# Patient Record
Sex: FEMALE | Race: BLACK OR AFRICAN AMERICAN | Employment: FULL TIME | URBAN - METROPOLITAN AREA
[De-identification: names, ages, dates, MRNs, and addresses within clinical notes are randomized per-mention and may not be internally consistent; named-entity substitution may affect disease eponyms.]

---

## 2018-04-18 ENCOUNTER — TELEPHONE (OUTPATIENT)
Dept: ORTHOPEDIC SURGERY | Age: 43
End: 2018-04-18

## 2018-11-15 ENCOUNTER — APPOINTMENT (OUTPATIENT)
Dept: CT IMAGING | Age: 43
End: 2018-11-15
Payer: COMMERCIAL

## 2018-11-15 ENCOUNTER — HOSPITAL ENCOUNTER (EMERGENCY)
Age: 43
Discharge: HOME | End: 2018-11-15
Attending: EMERGENCY MEDICINE
Payer: COMMERCIAL

## 2018-11-15 VITALS
SYSTOLIC BLOOD PRESSURE: 131 MMHG | WEIGHT: 217 LBS | RESPIRATION RATE: 16 BRPM | DIASTOLIC BLOOD PRESSURE: 74 MMHG | HEIGHT: 66 IN | TEMPERATURE: 97.9 F | BODY MASS INDEX: 34.87 KG/M2 | HEART RATE: 70 BPM | OXYGEN SATURATION: 100 %

## 2018-11-15 DIAGNOSIS — R51.9 ACUTE NONINTRACTABLE HEADACHE, UNSPECIFIED HEADACHE TYPE: Primary | ICD-10-CM

## 2018-11-15 DIAGNOSIS — R42 INTERMITTENT LIGHTHEADEDNESS: ICD-10-CM

## 2018-11-15 LAB
A/G RATIO: 1.2 (ref 1.1–2.2)
ALBUMIN SERPL-MCNC: 4.2 G/DL (ref 3.4–5)
ALP BLD-CCNC: 43 U/L (ref 40–129)
ALT SERPL-CCNC: 11 U/L (ref 10–40)
ANION GAP SERPL CALCULATED.3IONS-SCNC: 10 MMOL/L (ref 3–16)
AST SERPL-CCNC: 12 U/L (ref 15–37)
BASOPHILS ABSOLUTE: 0 K/UL (ref 0–0.2)
BASOPHILS RELATIVE PERCENT: 0.9 %
BILIRUB SERPL-MCNC: 0.5 MG/DL (ref 0–1)
BUN BLDV-MCNC: 13 MG/DL (ref 7–20)
CALCIUM SERPL-MCNC: 8.8 MG/DL (ref 8.3–10.6)
CHLORIDE BLD-SCNC: 101 MMOL/L (ref 99–110)
CO2: 27 MMOL/L (ref 21–32)
CREAT SERPL-MCNC: 0.6 MG/DL (ref 0.6–1.1)
EKG ATRIAL RATE: 68 BPM
EKG DIAGNOSIS: NORMAL
EKG P AXIS: 20 DEGREES
EKG P-R INTERVAL: 170 MS
EKG Q-T INTERVAL: 412 MS
EKG QRS DURATION: 86 MS
EKG QTC CALCULATION (BAZETT): 438 MS
EKG R AXIS: 12 DEGREES
EKG T AXIS: 0 DEGREES
EKG VENTRICULAR RATE: 68 BPM
EOSINOPHILS ABSOLUTE: 0.1 K/UL (ref 0–0.6)
EOSINOPHILS RELATIVE PERCENT: 1.1 %
GFR AFRICAN AMERICAN: >60
GFR NON-AFRICAN AMERICAN: >60
GLOBULIN: 3.4 G/DL
GLUCOSE BLD-MCNC: 96 MG/DL (ref 70–99)
HCG QUALITATIVE: NEGATIVE
HCT VFR BLD CALC: 37.6 % (ref 36–48)
HEMOGLOBIN: 12.2 G/DL (ref 12–16)
LYMPHOCYTES ABSOLUTE: 2 K/UL (ref 1–5.1)
LYMPHOCYTES RELATIVE PERCENT: 41.6 %
MCH RBC QN AUTO: 25.9 PG (ref 26–34)
MCHC RBC AUTO-ENTMCNC: 32.4 G/DL (ref 31–36)
MCV RBC AUTO: 79.9 FL (ref 80–100)
MONOCYTES ABSOLUTE: 0.3 K/UL (ref 0–1.3)
MONOCYTES RELATIVE PERCENT: 6.5 %
NEUTROPHILS ABSOLUTE: 2.4 K/UL (ref 1.7–7.7)
NEUTROPHILS RELATIVE PERCENT: 49.9 %
PDW BLD-RTO: 14.7 % (ref 12.4–15.4)
PLATELET # BLD: 305 K/UL (ref 135–450)
PMV BLD AUTO: 8.2 FL (ref 5–10.5)
POTASSIUM REFLEX MAGNESIUM: 3.9 MMOL/L (ref 3.5–5.1)
RBC # BLD: 4.7 M/UL (ref 4–5.2)
SODIUM BLD-SCNC: 138 MMOL/L (ref 136–145)
TOTAL PROTEIN: 7.6 G/DL (ref 6.4–8.2)
WBC # BLD: 4.9 K/UL (ref 4–11)

## 2018-11-15 PROCEDURE — 93010 ELECTROCARDIOGRAM REPORT: CPT | Performed by: INTERNAL MEDICINE

## 2018-11-15 PROCEDURE — 84703 CHORIONIC GONADOTROPIN ASSAY: CPT

## 2018-11-15 PROCEDURE — 85025 COMPLETE CBC W/AUTO DIFF WBC: CPT

## 2018-11-15 PROCEDURE — 6360000002 HC RX W HCPCS: Performed by: EMERGENCY MEDICINE

## 2018-11-15 PROCEDURE — 80053 COMPREHEN METABOLIC PANEL: CPT

## 2018-11-15 PROCEDURE — 96361 HYDRATE IV INFUSION ADD-ON: CPT

## 2018-11-15 PROCEDURE — 99285 EMERGENCY DEPT VISIT HI MDM: CPT

## 2018-11-15 PROCEDURE — 70496 CT ANGIOGRAPHY HEAD: CPT

## 2018-11-15 PROCEDURE — 6360000004 HC RX CONTRAST MEDICATION: Performed by: PHYSICIAN ASSISTANT

## 2018-11-15 PROCEDURE — 93005 ELECTROCARDIOGRAM TRACING: CPT | Performed by: PHYSICIAN ASSISTANT

## 2018-11-15 PROCEDURE — 96374 THER/PROPH/DIAG INJ IV PUSH: CPT

## 2018-11-15 PROCEDURE — 2580000003 HC RX 258: Performed by: PHYSICIAN ASSISTANT

## 2018-11-15 RX ORDER — KETOROLAC TROMETHAMINE 30 MG/ML
30 INJECTION, SOLUTION INTRAMUSCULAR; INTRAVENOUS ONCE
Status: COMPLETED | OUTPATIENT
Start: 2018-11-15 | End: 2018-11-15

## 2018-11-15 RX ORDER — BUTALBITAL, ACETAMINOPHEN AND CAFFEINE 300; 40; 50 MG/1; MG/1; MG/1
1 CAPSULE ORAL EVERY 6 HOURS PRN
Qty: 20 CAPSULE | Refills: 0 | Status: SHIPPED | OUTPATIENT
Start: 2018-11-15 | End: 2022-09-15

## 2018-11-15 RX ORDER — 0.9 % SODIUM CHLORIDE 0.9 %
1000 INTRAVENOUS SOLUTION INTRAVENOUS ONCE
Status: COMPLETED | OUTPATIENT
Start: 2018-11-15 | End: 2018-11-15

## 2018-11-15 RX ADMIN — SODIUM CHLORIDE 1000 ML: 9 INJECTION, SOLUTION INTRAVENOUS at 10:57

## 2018-11-15 RX ADMIN — IOPAMIDOL 75 ML: 755 INJECTION, SOLUTION INTRAVENOUS at 11:45

## 2018-11-15 RX ADMIN — KETOROLAC TROMETHAMINE 30 MG: 30 INJECTION, SOLUTION INTRAMUSCULAR at 13:06

## 2018-11-15 ASSESSMENT — ENCOUNTER SYMPTOMS
NAUSEA: 0
DIARRHEA: 0
BACK PAIN: 0
SHORTNESS OF BREATH: 0
COLOR CHANGE: 0
VOMITING: 0
CONSTIPATION: 0
ABDOMINAL PAIN: 0

## 2018-11-15 ASSESSMENT — PAIN SCALES - GENERAL: PAINLEVEL_OUTOF10: 7

## 2018-11-15 NOTE — ED PROVIDER NOTES
reviewed and agreed with or any disagreements were addressed  in the HPI. REVIEW OF SYSTEMS    (2-9 systems for level 4, 10 or more for level 5)     Review of Systems   Constitutional: Negative for chills, fatigue and fever. Respiratory: Negative for shortness of breath. Cardiovascular: Negative for chest pain. Gastrointestinal: Negative for abdominal pain, constipation, diarrhea, nausea and vomiting. Genitourinary: Negative for difficulty urinating and dysuria. Musculoskeletal: Negative for back pain and neck pain. Skin: Negative for color change and rash. Neurological: Positive for light-headedness and headaches. Negative for dizziness, syncope, weakness and numbness. All other systems reviewed and are negative. Positives and pertinent negatives as per HPI. All other systems were reviewed and are negative. PHYSICAL EXAM    (up to 7 for level 4, 8 or more for level 5)     ED Triage Vitals [11/15/18 0942]   BP Temp Temp Source Pulse Resp SpO2 Height Weight   135/82 97.9 °F (36.6 °C) Infrared 80 14 96 % 5' 6\" (1.676 m) 217 lb (98.4 kg)       Physical Exam   Constitutional: She is oriented to person, place, and time. She appears well-developed and well-nourished. She is active and cooperative. Non-toxic appearance. HENT:   Head: Normocephalic. Right Ear: External ear normal.   Left Ear: External ear normal.   Nose: Nose normal.   Eyes: Pupils are equal, round, and reactive to light. Conjunctivae and EOM are normal. Right eye exhibits no discharge. Left eye exhibits no discharge. Neck: Normal range of motion, full passive range of motion without pain and phonation normal. Neck supple. No spinous process tenderness and no muscular tenderness present. No neck rigidity. Normal range of motion present. No Brudzinski's sign and no Kernig's sign noted. Cardiovascular: Normal rate, regular rhythm and normal heart sounds. Exam reveals no gallop and no friction rub.     No murmur referral          [facility]  [phone]          DISCHARGE MEDICATIONS:  Discharge Medication List as of 11/15/2018  1:18 PM      START taking these medications    Details   butalbital-APAP-caffeine (FIORICET) -40 MG CAPS per capsule Take 1 capsule by mouth every 6 hours as needed for Headaches, Disp-20 capsule, R-0Print             DISCONTINUED MEDICATIONS:  Discharge Medication List as of 11/15/2018  1:18 PM                 (Please note that portions of this note were completed with a voice recognition program.  Efforts were made to edit the dictations but occasionally words aremis-transcribed.)    TRANG Abad (electronically signed)            TRANG Nova  11/15/18 2923

## 2022-08-25 ENCOUNTER — OFFICE VISIT (OUTPATIENT)
Dept: FAMILY MEDICINE CLINIC | Age: 47
End: 2022-08-25
Payer: COMMERCIAL

## 2022-08-25 ENCOUNTER — TELEPHONE (OUTPATIENT)
Dept: FAMILY MEDICINE CLINIC | Age: 47
End: 2022-08-25

## 2022-08-25 VITALS
DIASTOLIC BLOOD PRESSURE: 76 MMHG | OXYGEN SATURATION: 96 % | HEART RATE: 68 BPM | BODY MASS INDEX: 35.68 KG/M2 | HEIGHT: 66 IN | SYSTOLIC BLOOD PRESSURE: 112 MMHG | WEIGHT: 222 LBS

## 2022-08-25 DIAGNOSIS — Z13.31 POSITIVE DEPRESSION SCREENING: ICD-10-CM

## 2022-08-25 DIAGNOSIS — Z76.89 ENCOUNTER TO ESTABLISH CARE: Primary | ICD-10-CM

## 2022-08-25 DIAGNOSIS — N95.1 MENOPAUSAL SYMPTOMS: ICD-10-CM

## 2022-08-25 DIAGNOSIS — Z13.1 SCREENING FOR DIABETES MELLITUS: ICD-10-CM

## 2022-08-25 DIAGNOSIS — Z13.220 SCREENING FOR LIPID DISORDERS: ICD-10-CM

## 2022-08-25 DIAGNOSIS — Z12.11 SCREEN FOR COLON CANCER: ICD-10-CM

## 2022-08-25 PROBLEM — O24.410 DIET CONTROLLED GESTATIONAL DIABETES MELLITUS (GDM), ANTEPARTUM: Status: ACTIVE | Noted: 2022-08-25

## 2022-08-25 PROBLEM — O24.410 DIET CONTROLLED GESTATIONAL DIABETES MELLITUS (GDM), ANTEPARTUM: Status: RESOLVED | Noted: 2022-08-25 | Resolved: 2022-08-25

## 2022-08-25 LAB
A/G RATIO: 1.7 (ref 1.1–2.2)
ALBUMIN SERPL-MCNC: 4.4 G/DL (ref 3.4–5)
ALP BLD-CCNC: 47 U/L (ref 40–129)
ALT SERPL-CCNC: 11 U/L (ref 10–40)
ANION GAP SERPL CALCULATED.3IONS-SCNC: 9 MMOL/L (ref 3–16)
AST SERPL-CCNC: 14 U/L (ref 15–37)
BILIRUB SERPL-MCNC: 0.3 MG/DL (ref 0–1)
BUN BLDV-MCNC: 9 MG/DL (ref 7–20)
CALCIUM SERPL-MCNC: 9.2 MG/DL (ref 8.3–10.6)
CHLORIDE BLD-SCNC: 103 MMOL/L (ref 99–110)
CHOLESTEROL, TOTAL: 183 MG/DL (ref 0–199)
CO2: 26 MMOL/L (ref 21–32)
CREAT SERPL-MCNC: 0.7 MG/DL (ref 0.6–1.1)
ESTIMATED AVERAGE GLUCOSE: 145.6 MG/DL
GFR AFRICAN AMERICAN: >60
GFR NON-AFRICAN AMERICAN: >60
GLUCOSE BLD-MCNC: 103 MG/DL (ref 70–99)
HBA1C MFR BLD: 6.7 %
HDLC SERPL-MCNC: 50 MG/DL (ref 40–60)
LDL CHOLESTEROL CALCULATED: 117 MG/DL
POTASSIUM SERPL-SCNC: 4.4 MMOL/L (ref 3.5–5.1)
SODIUM BLD-SCNC: 138 MMOL/L (ref 136–145)
TOTAL PROTEIN: 7 G/DL (ref 6.4–8.2)
TRIGL SERPL-MCNC: 82 MG/DL (ref 0–150)
VLDLC SERPL CALC-MCNC: 16 MG/DL

## 2022-08-25 PROCEDURE — 99204 OFFICE O/P NEW MOD 45 MIN: CPT | Performed by: NURSE PRACTITIONER

## 2022-08-25 RX ORDER — ESCITALOPRAM OXALATE 10 MG/1
10 TABLET ORAL DAILY
Qty: 30 TABLET | Refills: 0 | Status: SHIPPED | OUTPATIENT
Start: 2022-08-25 | End: 2022-09-19

## 2022-08-25 SDOH — ECONOMIC STABILITY: TRANSPORTATION INSECURITY
IN THE PAST 12 MONTHS, HAS LACK OF TRANSPORTATION KEPT YOU FROM MEETINGS, WORK, OR FROM GETTING THINGS NEEDED FOR DAILY LIVING?: NO

## 2022-08-25 SDOH — HEALTH STABILITY: PHYSICAL HEALTH: ON AVERAGE, HOW MANY MINUTES DO YOU ENGAGE IN EXERCISE AT THIS LEVEL?: 150+ MIN

## 2022-08-25 SDOH — ECONOMIC STABILITY: TRANSPORTATION INSECURITY
IN THE PAST 12 MONTHS, HAS THE LACK OF TRANSPORTATION KEPT YOU FROM MEDICAL APPOINTMENTS OR FROM GETTING MEDICATIONS?: NO

## 2022-08-25 SDOH — ECONOMIC STABILITY: FOOD INSECURITY: WITHIN THE PAST 12 MONTHS, THE FOOD YOU BOUGHT JUST DIDN'T LAST AND YOU DIDN'T HAVE MONEY TO GET MORE.: NEVER TRUE

## 2022-08-25 SDOH — HEALTH STABILITY: PHYSICAL HEALTH: ON AVERAGE, HOW MANY DAYS PER WEEK DO YOU ENGAGE IN MODERATE TO STRENUOUS EXERCISE (LIKE A BRISK WALK)?: 5 DAYS

## 2022-08-25 SDOH — ECONOMIC STABILITY: FOOD INSECURITY: WITHIN THE PAST 12 MONTHS, YOU WORRIED THAT YOUR FOOD WOULD RUN OUT BEFORE YOU GOT MONEY TO BUY MORE.: NEVER TRUE

## 2022-08-25 ASSESSMENT — PATIENT HEALTH QUESTIONNAIRE - PHQ9
6. FEELING BAD ABOUT YOURSELF - OR THAT YOU ARE A FAILURE OR HAVE LET YOURSELF OR YOUR FAMILY DOWN: 1
9. THOUGHTS THAT YOU WOULD BE BETTER OFF DEAD, OR OF HURTING YOURSELF: 0
2. FEELING DOWN, DEPRESSED OR HOPELESS: 1
8. MOVING OR SPEAKING SO SLOWLY THAT OTHER PEOPLE COULD HAVE NOTICED. OR THE OPPOSITE, BEING SO FIGETY OR RESTLESS THAT YOU HAVE BEEN MOVING AROUND A LOT MORE THAN USUAL: 0
3. TROUBLE FALLING OR STAYING ASLEEP: 2
SUM OF ALL RESPONSES TO PHQ QUESTIONS 1-9: 10
4. FEELING TIRED OR HAVING LITTLE ENERGY: 2
5. POOR APPETITE OR OVEREATING: 2
SUM OF ALL RESPONSES TO PHQ QUESTIONS 1-9: 10
1. LITTLE INTEREST OR PLEASURE IN DOING THINGS: 2
7. TROUBLE CONCENTRATING ON THINGS, SUCH AS READING THE NEWSPAPER OR WATCHING TELEVISION: 0
10. IF YOU CHECKED OFF ANY PROBLEMS, HOW DIFFICULT HAVE THESE PROBLEMS MADE IT FOR YOU TO DO YOUR WORK, TAKE CARE OF THINGS AT HOME, OR GET ALONG WITH OTHER PEOPLE: 0
SUM OF ALL RESPONSES TO PHQ9 QUESTIONS 1 & 2: 3

## 2022-08-25 ASSESSMENT — SOCIAL DETERMINANTS OF HEALTH (SDOH)
WITHIN THE LAST YEAR, HAVE YOU BEEN HUMILIATED OR EMOTIONALLY ABUSED IN OTHER WAYS BY YOUR PARTNER OR EX-PARTNER?: NO
HOW HARD IS IT FOR YOU TO PAY FOR THE VERY BASICS LIKE FOOD, HOUSING, MEDICAL CARE, AND HEATING?: NOT HARD AT ALL
WITHIN THE LAST YEAR, HAVE YOU BEEN KICKED, HIT, SLAPPED, OR OTHERWISE PHYSICALLY HURT BY YOUR PARTNER OR EX-PARTNER?: NO
WITHIN THE LAST YEAR, HAVE TO BEEN RAPED OR FORCED TO HAVE ANY KIND OF SEXUAL ACTIVITY BY YOUR PARTNER OR EX-PARTNER?: NO
WITHIN THE LAST YEAR, HAVE YOU BEEN AFRAID OF YOUR PARTNER OR EX-PARTNER?: NO

## 2022-08-25 ASSESSMENT — ENCOUNTER SYMPTOMS
SINUS PAIN: 0
ABDOMINAL DISTENTION: 0
ABDOMINAL PAIN: 0
SHORTNESS OF BREATH: 0
SORE THROAT: 0
SINUS PRESSURE: 0
WHEEZING: 0
EYE REDNESS: 0
COUGH: 0
EYE PAIN: 0
NAUSEA: 0
VOMITING: 0
DIARRHEA: 0
EYE DISCHARGE: 0

## 2022-08-25 NOTE — TELEPHONE ENCOUNTER
I was going to wait for everything to be back, but here's my result note: Your A1C is still pending. Your LDL (Bad cholesterol) is elevated. No medication is needed for this at this time, but you can help lower by decreasing fatty/processed foods in your diet, increasing cardiac exercise, and weight loss. Your fasting sugar was very slightly elevated. The A1C is a much more accurate check for diabetes, so we will see what this shows. One of the liver enzymes was low, but this is nothing to be concerned about.

## 2022-08-25 NOTE — PROGRESS NOTES
Jane Jackson (:  1975) is a 52 y.o. female,Established patient, here for evaluation of the following chief complaint(s):  New Patient (NEW PATIENT - EST CARE ) and Other (PT IS HAVING HOT FLASHES AND STARTS CRYING FOR NO REASON X 2 WEEKS - DISCUSS FMLA PAPERWORK )      ASSESSMENT/PLAN:  1. Encounter to establish care  -The patient's medical, surgical, social, and family history were reviewed with the patient. Medications were reconciled. 2. Menopausal symptoms  -Presentation is consistent with menopausal syndrome. I educated the patient that I do not fill out FMLA for menopausal symptoms. Not appropriate. We will start escitalopram to help with the mood fluctuation. Educated on the medication use as well as side effects. Follow-up in 1 month virtually, or sooner if needed.  -The patient is having significant hot flashes. Already on birth control. She is not established with a gynecologist at this time. Overdue for a Pap. I did recommend that the patient establish with a gynecologist and discussed this with them. -     escitalopram (LEXAPRO) 10 MG tablet; Take 1 tablet by mouth daily, Disp-30 tablet, R-0Normal  -     Jace Hilliard MD, Gynecology, [facility]  3. Screening for lipid disorders  -Has not had labs in multiple years. Check labs today. -     Lipid Panel; Future  4. Screening for diabetes mellitus  -Has not had labs in multiple years. Check labs today. -     Comprehensive Metabolic Panel; Future  -     Hemoglobin A1C; Future  5. Screen for colon cancer  -Discussed options. Referring to GI. At the end of the appointment, the patient mentions some issues with swallowing. May benefit from EGD with colonoscopy. Encouraged to discuss this with GI.  -     TANESHA Aguirre MD, Gastroenterology, [facility]  6.  Positive depression screening  - PHQ-9 score today: (PHQ-9 Total Score: 10), additional evaluation and assessment performed, follow-up plan includes but not limited to: Starting escitalopram.  Follow-up in 1 month, or sooner if needed    Return in about 1 month (around 9/25/2022) for Follow-up menopause. SUBJECTIVE/OBJECTIVE:  JAK Collins presents today to establish care. Her main concern is menopausal symptoms. She states that she has been having significant hot flashes. She also states that she has been finding herself crying for no reason. It got to the point last week that work told her to go home. She states that they told her to inquire about FMLA. Her depression screen is positive today. She states that she overall does not feel depressed but states that she can be laughing and crying at the same time. No thoughts of suicidal ideation or self-harm. She does take birth control. Not established with a gynecologist.    The patient has not seen a PCP in quite some time. Fasting for labs today. Agreeable to colonoscopy. At the end of the appointment, she mentioned that she has felt that food gets caught sometimes in her esophagus. Inquiring what to do about this. She works as a  for the [employer]. Works the night shift. Has 5 kids. Very wide age range. Has a 10year-old which was a surprise. Only significant medical history outside of pregnancy is an episode in 2005 where she may have had a stroke. 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 Symptoms alleviated on their own. She does not smoke. Drinks socially. Review of Systems   Constitutional:  Negative for chills and fever. HENT:  Negative for ear discharge, ear pain, hearing loss, sinus pressure, sinus pain and sore throat. Eyes:  Negative for pain, discharge and redness. Respiratory:  Negative for cough, shortness of breath and wheezing. Cardiovascular:  Negative for chest pain and palpitations. Gastrointestinal:  Negative for abdominal distention, abdominal pain, diarrhea, nausea and vomiting. Endocrine:         Hot flashes   Genitourinary:  Negative for dysuria and hematuria. Musculoskeletal:  Negative for myalgias. Skin:  Negative for rash. Neurological:  Negative for dizziness, weakness, light-headedness, numbness and headaches. Psychiatric/Behavioral:  Negative for decreased concentration, dysphoric mood, self-injury, sleep disturbance and suicidal ideas. The patient is not nervous/anxious. Mood fluctuation     Physical Exam  Constitutional:       General: She is not in acute distress. Appearance: Normal appearance. She is obese. HENT:      Head: Normocephalic and atraumatic. Right Ear: Tympanic membrane, ear canal and external ear normal.      Left Ear: Tympanic membrane, ear canal and external ear normal.      Mouth/Throat:      Mouth: Mucous membranes are moist.   Eyes:      Extraocular Movements: Extraocular movements intact. Conjunctiva/sclera: Conjunctivae normal.      Pupils: Pupils are equal, round, and reactive to light. Neck:      Thyroid: No thyromegaly. Vascular: No carotid bruit. Cardiovascular:      Rate and Rhythm: Normal rate and regular rhythm. Pulses: Normal pulses. Heart sounds: Normal heart sounds. No murmur heard. No gallop. Pulmonary:      Effort: Pulmonary effort is normal.      Breath sounds: Normal breath sounds. No wheezing. Abdominal:      General: Bowel sounds are normal.      Palpations: Abdomen is soft. Tenderness: There is no abdominal tenderness. There is no guarding or rebound. Musculoskeletal:         General: Normal range of motion. Cervical back: Normal range of motion and neck supple. Lymphadenopathy:      Cervical: No cervical adenopathy. Skin:     General: Skin is warm and dry. Capillary Refill: Capillary refill takes less than 2 seconds. Neurological:      Mental Status: She is alert and oriented to person, place, and time.    Psychiatric:         Mood and Affect: Mood normal.         Behavior: Behavior

## 2022-08-25 NOTE — TELEPHONE ENCOUNTER
Pt saw Melly Person today. She has questions about her blood work results. She  saw that a number was high. Wants to know if there is something she should be doing.

## 2022-08-25 NOTE — LETTER
[address] [address] [address] [facility] [address]  Phone: [phone]  Fax: [phone]    RAMIN Maldonado CNP        August 25, 2022     Patient: Efren Vicente   YOB: 1975   Date of Visit: 8/25/2022       To Whom It May Concern: It is my medical opinion that Edwin Thomas may return to work on 8/26/2022. If you have any questions or concerns, please don't hesitate to call.     Sincerely,            RAMIN Maldonado CNP

## 2022-09-02 ENCOUNTER — OFFICE VISIT (OUTPATIENT)
Dept: GYNECOLOGY | Age: 47
End: 2022-09-02
Payer: COMMERCIAL

## 2022-09-02 VITALS
WEIGHT: 219 LBS | DIASTOLIC BLOOD PRESSURE: 76 MMHG | BODY MASS INDEX: 35.35 KG/M2 | HEART RATE: 74 BPM | SYSTOLIC BLOOD PRESSURE: 130 MMHG

## 2022-09-02 DIAGNOSIS — Z01.419 WELL WOMAN EXAM WITH ROUTINE GYNECOLOGICAL EXAM: Primary | ICD-10-CM

## 2022-09-02 DIAGNOSIS — R23.2 HOT FLASHES: ICD-10-CM

## 2022-09-02 PROCEDURE — 99386 PREV VISIT NEW AGE 40-64: CPT | Performed by: OBSTETRICS & GYNECOLOGY

## 2022-09-02 NOTE — PROGRESS NOTES
Subjective:      Patient ID: Adilene Coleman is a 52 y.o. female. HPI  pts here for annual gyn exam.  Having hot flashes. On ocps and lexapro for sxs. Works nights at the [employer]. Has a 10year old son. Review of Systems Pertinent review of systems items discussed above. All others systems items not discussed above were negative. Objective:   Physical Exam  Constitutional:       Appearance: She is well-developed. HENT:      Head: Normocephalic and atraumatic. Neck:      Thyroid: No thyromegaly. Trachea: No tracheal deviation. Cardiovascular:      Rate and Rhythm: Normal rate and regular rhythm. Heart sounds: Normal heart sounds. No murmur heard. Pulmonary:      Effort: Pulmonary effort is normal. No respiratory distress. Breath sounds: Normal breath sounds. No wheezing or rales. Chest:   Breasts:     Right: No mass, nipple discharge or skin change. Left: No mass, nipple discharge or skin change. Abdominal:      General: There is no distension. Palpations: Abdomen is soft. There is no mass. Tenderness: There is no abdominal tenderness. There is no rebound. Genitourinary:     Labia:         Right: No lesion. Left: No lesion. Vagina: Normal. No foreign body. No vaginal discharge. Cervix: No cervical motion tenderness, discharge or friability. Uterus: Not deviated, not enlarged, not fixed and not tender. Adnexa:         Right: No mass or tenderness. Left: No mass or tenderness. Rectum: Normal. No external hemorrhoid. Comments: Pap performed. Musculoskeletal:         General: Normal range of motion. Lymphadenopathy:      Cervical: No cervical adenopathy. Neurological:      Mental Status: She is alert and oriented to person, place, and time. Assessment:   Normal gyn exam     Plan:   Recommend Bryon Terrell. Mammogram.  Call with results.   F/u annual gyn exam.       Yaquelin Winkler MD

## 2022-09-15 NOTE — PROGRESS NOTES
[facility] ENDOSCOPY EGD PRE-OPERATIVE INSTRUCTIONS    Procedure date__9/21/2022_______  Arrival time__830__________          Surgery time___930_________       Nothing by mouth after midnight the night before the procedure. This includes water chewing gum, mints and ice chips. You may brush your teeth and gargle the morning of your surgery, but do not swallow the water    You may be asked to stop blood thinners such as Coumadin, Plavix, Fragmin, Lovenox, etc., or any anti-inflammatories such as:  Aspirin, Ibuprofen, Advil, Naproxen prior to your procedure. We also ask that you stop any OTC medications such as fish oil, vitamin E, glucosamine, garlic, Multivitamins, COQ 10, etc.    You must make arrangements for a responsible adult to arrive with you and stay in our waiting area during your procedure. They will also need to take you home after your procedure. For your safety you will not be allowed to leave alone or drive yourself home. Also for your safety, it is strongly suggested that someone stay with you the first 24 hours after your procedure. For your comfort, please wear simple loose fitting clothing to the center. Please do not bring valuables. If you have a living will and a durable power of  for healthcare, please bring in a copy.     You will need to bring a photo ID and insurance card    Our goal is to provide you with excellent care so if you have any questions, please contact us at the [facility] at [phone]         Please note these are generalized instructions for all EGD cases, you may be provided with more specific instructions if necessary

## 2022-09-18 DIAGNOSIS — N95.1 MENOPAUSAL SYMPTOMS: ICD-10-CM

## 2022-09-19 RX ORDER — ESCITALOPRAM OXALATE 10 MG/1
TABLET ORAL
Qty: 30 TABLET | Refills: 0 | Status: SHIPPED | OUTPATIENT
Start: 2022-09-19 | End: 2022-09-28 | Stop reason: SDUPTHER

## 2022-09-20 ENCOUNTER — ANESTHESIA EVENT (OUTPATIENT)
Dept: ENDOSCOPY | Age: 47
End: 2022-09-20
Payer: COMMERCIAL

## 2022-09-20 ASSESSMENT — LIFESTYLE VARIABLES: SMOKING_STATUS: 0

## 2022-09-21 ENCOUNTER — ANESTHESIA (OUTPATIENT)
Dept: ENDOSCOPY | Age: 47
End: 2022-09-21
Payer: COMMERCIAL

## 2022-09-21 ENCOUNTER — HOSPITAL ENCOUNTER (OUTPATIENT)
Age: 47
Setting detail: OUTPATIENT SURGERY
Discharge: HOME | End: 2022-09-21
Attending: INTERNAL MEDICINE | Admitting: INTERNAL MEDICINE
Payer: COMMERCIAL

## 2022-09-21 VITALS
BODY MASS INDEX: 35.36 KG/M2 | RESPIRATION RATE: 18 BRPM | WEIGHT: 220 LBS | OXYGEN SATURATION: 100 % | HEIGHT: 66 IN | TEMPERATURE: 97.1 F | DIASTOLIC BLOOD PRESSURE: 78 MMHG | SYSTOLIC BLOOD PRESSURE: 122 MMHG | HEART RATE: 74 BPM

## 2022-09-21 LAB
GLUCOSE BLD-MCNC: 113 MG/DL (ref 70–99)
PERFORMED ON: ABNORMAL
PREGNANCY, URINE: NEGATIVE

## 2022-09-21 PROCEDURE — 3700000000 HC ANESTHESIA ATTENDED CARE: Performed by: INTERNAL MEDICINE

## 2022-09-21 PROCEDURE — 3609015300 HC ESOPHAGEAL DILATION MALONEY: Performed by: INTERNAL MEDICINE

## 2022-09-21 PROCEDURE — 2580000003 HC RX 258: Performed by: ANESTHESIOLOGY

## 2022-09-21 PROCEDURE — 6360000002 HC RX W HCPCS

## 2022-09-21 PROCEDURE — 7100000010 HC PHASE II RECOVERY - FIRST 15 MIN: Performed by: INTERNAL MEDICINE

## 2022-09-21 PROCEDURE — 3609017100 HC EGD: Performed by: INTERNAL MEDICINE

## 2022-09-21 PROCEDURE — 84703 CHORIONIC GONADOTROPIN ASSAY: CPT

## 2022-09-21 PROCEDURE — 3700000001 HC ADD 15 MINUTES (ANESTHESIA): Performed by: INTERNAL MEDICINE

## 2022-09-21 PROCEDURE — 2500000003 HC RX 250 WO HCPCS

## 2022-09-21 PROCEDURE — 7100000011 HC PHASE II RECOVERY - ADDTL 15 MIN: Performed by: INTERNAL MEDICINE

## 2022-09-21 RX ORDER — GLYCOPYRROLATE 0.2 MG/ML
INJECTION INTRAMUSCULAR; INTRAVENOUS PRN
Status: DISCONTINUED | OUTPATIENT
Start: 2022-09-21 | End: 2022-09-21 | Stop reason: SDUPTHER

## 2022-09-21 RX ORDER — DEXTROSE MONOHYDRATE 100 MG/ML
INJECTION, SOLUTION INTRAVENOUS CONTINUOUS PRN
Status: DISCONTINUED | OUTPATIENT
Start: 2022-09-21 | End: 2022-09-21 | Stop reason: HOSPADM

## 2022-09-21 RX ORDER — PROPOFOL 10 MG/ML
INJECTION, EMULSION INTRAVENOUS PRN
Status: DISCONTINUED | OUTPATIENT
Start: 2022-09-21 | End: 2022-09-21 | Stop reason: SDUPTHER

## 2022-09-21 RX ORDER — SODIUM CHLORIDE 0.9 % (FLUSH) 0.9 %
5-40 SYRINGE (ML) INJECTION PRN
Status: DISCONTINUED | OUTPATIENT
Start: 2022-09-21 | End: 2022-09-21 | Stop reason: HOSPADM

## 2022-09-21 RX ORDER — SODIUM CHLORIDE 0.9 % (FLUSH) 0.9 %
5-40 SYRINGE (ML) INJECTION EVERY 12 HOURS SCHEDULED
Status: DISCONTINUED | OUTPATIENT
Start: 2022-09-21 | End: 2022-09-21 | Stop reason: HOSPADM

## 2022-09-21 RX ORDER — LIDOCAINE HYDROCHLORIDE 20 MG/ML
INJECTION, SOLUTION EPIDURAL; INFILTRATION; INTRACAUDAL; PERINEURAL PRN
Status: DISCONTINUED | OUTPATIENT
Start: 2022-09-21 | End: 2022-09-21 | Stop reason: SDUPTHER

## 2022-09-21 RX ORDER — SODIUM CHLORIDE 9 MG/ML
INJECTION, SOLUTION INTRAVENOUS PRN
Status: DISCONTINUED | OUTPATIENT
Start: 2022-09-21 | End: 2022-09-21 | Stop reason: HOSPADM

## 2022-09-21 RX ADMIN — GLYCOPYRROLATE 0.1 MG: 0.2 INJECTION, SOLUTION INTRAMUSCULAR; INTRAVENOUS at 09:10

## 2022-09-21 RX ADMIN — PROPOFOL 50 MG: 10 INJECTION, EMULSION INTRAVENOUS at 09:22

## 2022-09-21 RX ADMIN — PROPOFOL 100 MG: 10 INJECTION, EMULSION INTRAVENOUS at 09:17

## 2022-09-21 RX ADMIN — SODIUM CHLORIDE: 9 INJECTION, SOLUTION INTRAVENOUS at 09:02

## 2022-09-21 RX ADMIN — LIDOCAINE HYDROCHLORIDE 100 MG: 20 INJECTION, SOLUTION EPIDURAL; INFILTRATION; INTRACAUDAL; PERINEURAL at 09:17

## 2022-09-21 RX ADMIN — PROPOFOL 50 MG: 10 INJECTION, EMULSION INTRAVENOUS at 09:20

## 2022-09-21 ASSESSMENT — PAIN SCALES - GENERAL
PAINLEVEL_OUTOF10: 0

## 2022-09-21 ASSESSMENT — PAIN - FUNCTIONAL ASSESSMENT: PAIN_FUNCTIONAL_ASSESSMENT: 0-10

## 2022-09-21 NOTE — ANESTHESIA PRE PROCEDURE
Department of Anesthesiology  Preprocedure Note       Name:  Joey Bryson   Age:  52 y.o.  :  1975                                          MRN:  [id number]         Date:  2022      Surgeon: Blayne Montelongo):  Katie Davila MD    Procedure: Procedure(s):  EGD ESOPHAGOGASTRODUODENOSCOPY    Medications prior to admission:   Prior to Admission medications    Medication Sig Start Date End Date Taking? Authorizing Provider   escitalopram (LEXAPRO) 10 MG tablet TAKE 1 TABLET BY MOUTH EVERY DAY 22   RAMIN Bacon - SHAUN   norgestrel-ethinyl estradiol (LO/OVRAL) 0.3-30 MG-MCG per tablet Take 1 tablet by mouth daily    Historical Provider, MD       Current medications:    No current facility-administered medications for this encounter. Current Outpatient Medications   Medication Sig Dispense Refill    escitalopram (LEXAPRO) 10 MG tablet TAKE 1 TABLET BY MOUTH EVERY DAY 30 tablet 0    norgestrel-ethinyl estradiol (LO/OVRAL) 0.3-30 MG-MCG per tablet Take 1 tablet by mouth daily         Allergies:     Allergies   Allergen Reactions    Penicillins Swelling       Problem List:    Patient Active Problem List   Diagnosis Code   (none) - all problems resolved or deleted       Past Medical History:        Diagnosis Date    Unspecified cerebral artery occlusion with cerebral infarction            Past Surgical History:        Procedure Laterality Date    ADENOIDECTOMY      HERNIA REPAIR      TONSILLECTOMY         Social History:    Social History     Tobacco Use    Smoking status: Never    Smokeless tobacco: Never   Substance Use Topics    Alcohol use: Yes     Comment: Social/Rare                                Counseling given: Not Answered      Vital Signs (Current):   Vitals:    09/15/22 0939   Weight: 220 lb (99.8 kg)   Height: 5' 6\" (1.676 m)                                              BP Readings from Last 3 Encounters:   22 130/76   22 112/76   11/15/18 131/74 NPO Status:                                                                                 BMI:   Wt Readings from Last 3 Encounters:   09/02/22 219 lb (99.3 kg)   08/25/22 222 lb (100.7 kg)   11/15/18 217 lb (98.4 kg)     Body mass index is 35.51 kg/m². CBC:   Lab Results   Component Value Date/Time    WBC 4.9 11/15/2018 10:55 AM    RBC 4.70 11/15/2018 10:55 AM    HGB 12.2 11/15/2018 10:55 AM    HCT 37.6 11/15/2018 10:55 AM    MCV 79.9 11/15/2018 10:55 AM    RDW 14.7 11/15/2018 10:55 AM     11/15/2018 10:55 AM       CMP:   Lab Results   Component Value Date/Time     08/25/2022 09:15 AM    K 4.4 08/25/2022 09:15 AM    K 3.9 11/15/2018 10:55 AM     08/25/2022 09:15 AM    CO2 26 08/25/2022 09:15 AM    BUN 9 08/25/2022 09:15 AM    CREATININE 0.7 08/25/2022 09:15 AM    GFRAA >60 08/25/2022 09:15 AM    GFRAA >60 04/18/2012 08:34 AM    AGRATIO 1.7 08/25/2022 09:15 AM    LABGLOM >60 08/25/2022 09:15 AM    GLUCOSE 103 08/25/2022 09:15 AM    PROT 7.0 08/25/2022 09:15 AM    CALCIUM 9.2 08/25/2022 09:15 AM    BILITOT 0.3 08/25/2022 09:15 AM    ALKPHOS 47 08/25/2022 09:15 AM    AST 14 08/25/2022 09:15 AM    ALT 11 08/25/2022 09:15 AM       POC Tests: No results for input(s): POCGLU, POCNA, POCK, POCCL, POCBUN, POCHEMO, POCHCT in the last 72 hours.     Coags:   Lab Results   Component Value Date/Time    PROTIME 9.8 05/29/2016 12:00 PM    INR 0.86 05/29/2016 12:00 PM    APTT 30.1 05/29/2016 12:00 PM       HCG (If Applicable):   Lab Results   Component Value Date    PREGTESTUR Negative 11/28/2016        ABGs: No results found for: PHART, PO2ART, LNU4UBW, ELJ8RRG, BEART, I3JPTRCK     Type & Screen (If Applicable):  No results found for: LABABO, LABRH    Drug/Infectious Status (If Applicable):  No results found for: HIV, HEPCAB    COVID-19 Screening (If Applicable): No results found for: COVID19        Anesthesia Evaluation   no history of anesthetic complications:   Airway: Mallampati: II  TM distance: >3 FB     Mouth opening: > = 3 FB   Dental: normal exam         Pulmonary: breath sounds clear to auscultation      (-) COPD, asthma, sleep apnea, rhonchi, wheezes, rales and not a current smoker                           Cardiovascular:  Exercise tolerance: good (>4 METS),       (-) past MI, orthopnea,  GONZALEZ, weak pulses, peripheral edema and no hyperlipidemia      Rhythm: regular  Rate: normal                 ROS comment: Echocardiogram:  Conclusions:  Normal left ventricular contractility.     No significant structural or valvular abnormalities.          Neuro/Psych:   (+) TIA (2005), depression/anxiety    (-) seizures           GI/Hepatic/Renal:        (-) liver disease, no renal disease and bowel prepMorbid obesity: BMI: 35.       Endo/Other:    (+) Diabetes (HgbA1c: 6.7%), .    (-) hypothyroidism, hyperthyroidism                ROS comment: + Perimenopausal  Abdominal:   (+) obese,           Vascular:           ROS comment: Carotid Duplex (2006): Impression:     DFA: Normal velocities, bilateral internal carotid arteries. Duplex Imaging: Reveals no stenosis, bilateral internal carotid arteries. Vertebral blood flow is antegrade bilaterally.     . Other Findings:           Anesthesia Plan      MAC     ASA 3     (Mike Michaels is a 47/F diabetic with history of TIA. NPO appropriate. Patient denies active nausea / reflux.)        Anesthetic plan and risks discussed with patient. Plan discussed with CRNA. This pre-anesthesia assessment may be used as a history and physical.    DOS STAFF ADDENDUM:    Pt seen and examined, chart reviewed (including anesthesia, drug and allergy history). No interval changes to history and physical examination. Anesthetic plan, risks, benefits, alternatives, and personnel involved discussed with patient. Patient verbalized an understanding and agrees to proceed.       Alexa Ricketts MD  September 21, 2022  9:00 AM

## 2022-09-21 NOTE — ANESTHESIA POSTPROCEDURE EVALUATION
Department of Anesthesiology  Postprocedure Note    Patient: Lorin Hassan  MRN: [id number]  YOB: 1975  Date of evaluation: 9/21/2022      Procedure Summary     Date: 09/21/22 Room / Location: [address] [place] [address]    Anesthesia Start: 6385 Anesthesia Stop: 0928    Procedures:       EGD ESOPHAGOGASTRODUODENOSCOPY      ESOPHAGEAL DILATION QUINTON Diagnosis:       Dysphagia, unspecified type      (Dysphagia)    Surgeons: Mónica Alaniz MD Responsible Provider: Tia Barrera MD    Anesthesia Type: MAC ASA Status: 3          Anesthesia Type: MAC    Justus Phase I: Justus Score: 10    Justus Phase II: Justus Score: 10      Anesthesia Post Evaluation    Patient location during evaluation: PACU  Patient participation: complete - patient participated  Level of consciousness: awake  Airway patency: patent  Nausea & Vomiting: no nausea and no vomiting  Complications: no  Cardiovascular status: hemodynamically stable and blood pressure returned to baseline  Respiratory status: spontaneous ventilation, nonlabored ventilation and room air (No cough observed)  Hydration status: stable  Comments: Ms. Ni Cleveland was seen resting comfortably following procedure. Appropriate for discharge home with .

## 2022-09-21 NOTE — H&P
[facility]   Pre-operative History and Physical    Patient: Abdulaziz Ngo  : 1975  Acct#:         HISTORY OF PRESENT ILLNESS:    The patient is a 52 y.o. female  who presents with dysphagia  Past Medical History:        Diagnosis Date    Unspecified cerebral artery occlusion with cerebral infarction          Past Surgical History:        Procedure Laterality Date    ADENOIDECTOMY      HERNIA REPAIR      TONSILLECTOMY       Medications prior to admission:   Prior to Admission medications    Medication Sig Start Date End Date Taking? Authorizing Provider   escitalopram (LEXAPRO) 10 MG tablet TAKE 1 TABLET BY MOUTH EVERY DAY 22   RAMIN Cohen - SHAUN   norgestrel-ethinyl estradiol (LO/OVRAL) 0.3-30 MG-MCG per tablet Take 1 tablet by mouth daily    Historical Provider, MD     Allergies:    Penicillins  Social History:   Social History     Socioeconomic History    Marital status:      Spouse name: Not on file    Number of children: Not on file    Years of education: Not on file    Highest education level: Not on file   Occupational History    Not on file   Tobacco Use    Smoking status: Never    Smokeless tobacco: Never   Vaping Use    Vaping Use: Never used   Substance and Sexual Activity    Alcohol use: Yes     Comment: Social/Rare    Drug use: No    Sexual activity: Yes     Partners: Male   Other Topics Concern    Not on file   Social History Narrative    Not on file     Social Determinants of Health     Financial Resource Strain: Low Risk     Difficulty of Paying Living Expenses: Not hard at all   Food Insecurity: No Food Insecurity    Worried About Running Out of Food in the Last Year: Never true    [address] in the Last Year: Never true   Transportation Needs: No Transportation Needs    Lack of Transportation (Medical): No    Lack of Transportation (Non-Medical):  No   Physical Activity: Sufficiently Active    Days of Exercise per Week: 5 days    Minutes of Exercise per Session: 150+ min   Stress: Not on file   Social Connections: Not on file   Intimate Partner Violence: Not At Risk    Fear of Current or Ex-Partner: No    Emotionally Abused: No    Physically Abused: No    Sexually Abused: No   Housing Stability: Not on file           Family History:   Family History   Problem Relation Age of Onset    Diabetes Mother     Mental Illness Maternal Grandmother         Alzheimer         PHYSICAL EXAM:      /72   Pulse 72   Temp 97.2 °F (36.2 °C) (Temporal)   Resp 16   Ht 5' 6\" (1.676 m)   Wt 220 lb (99.8 kg)   SpO2 100%   BMI 35.51 kg/m²  I        Heart: Normal    Lungs: Normal    Abdomen: Normal      ASA Grade: ASA 3 - Patient with moderate systemic disease with functional limitations    II (soft palate, uvula, fauces visible)  ASSESSMENT AND PLAN:    1. Patient is a 52 y.o. female here for EGD  2. Procedure options, risks and benefits reviewed with patient who expresses understanding.

## 2022-09-21 NOTE — DISCHARGE INSTRUCTIONS
[address]  [facility] Discharge Instructions   EGD (Esophagogastroduodenoscopy)    NAME:  Chacha Clayton  YOB: 1975  MEDICAL RECORD NUMBER:  [id number]  DATE:  9/21/2022      After receiving Propofol (Diprivan) for Moderate Sedation:    Do not drive or operate any machinery until tomorrow  Do not sign any legal documents or make any critical decisions  Do not drink alcoholic beverages for 24 hours  Plan to spend a few hours resting before resuming your normal routine  Possible side effects are light headedness and sedation    You may resume your usual diet at home    Resume all your daily medications    Call your physician if any of the following occur: If you have any difficulty breathing: CALL 911  Neck swelling  Difficulty swallowing  Excessive pain or abdominal distention  Fever, chills, nausea or vomiting    If you are unable to reach your physician or symptoms worsen, proceed to the nearest Emergency Room    You may use warm salt water gargles, lozenges or Chloraseptic spray as needed for your sore throat. Biopsy Obtained: NO    Recommendations: Repeat as needed for recurrent dysphagia    For questions or concerns please contact your GI physician's 24 hour call center at [phone].

## 2022-09-21 NOTE — BRIEF OP NOTE
Brief Postoperative Note    Qi Fermin  YOB: 1975  [id number]    Pre-operative Diagnosis: Dysphagia    Post-operative Diagnosis: Same    Procedure: EGD    Anesthesia: MAC    Surgeons/Assistants: Luigi Dykes MD    Estimated Blood Loss: None    Complications: None    Specimens: Was Not Obtained    Findings: See dictated report    Electronically signed by Luigi Dykes MD on 9/21/2022 at 9:28 AM

## 2022-09-21 NOTE — OP NOTE
Operative Note      Patient: Kamar Mckenzie  YOB: 1975  MRN: [id number]    Date of Procedure: 9/21/2022    Pre-Op Diagnosis: Dysphagia    Post-Op Diagnosis: Same       Procedure(s):  EGD ESOPHAGOGASTRODUODENOSCOPY  ESOPHAGEAL DILATION Chastity Jolley    Surgeon(s):  Olivier Deutsch MD    Assistant:   * No surgical staff found *    Anesthesia: Monitor Anesthesia Care    Estimated Blood Loss (mL): None    Complications: None    Specimens:   * No specimens in log *    Implants:  * No implants in log *      Drains: * No LDAs found *    Findings: See dictated report    Detailed Description of Procedure:   See dictated report    Electronically signed by Olivier Deutsch MD on 9/21/2022 at 9:28 AM

## 2022-09-22 NOTE — PROCEDURES
[address] 16                                 PROCEDURE NOTE    PATIENT NAME: Sybil Lazaro                     :        1975  MED REC NO:   [id number]                          ROOM:  ACCOUNT NO:   [de-identified]                           ADMIT DATE: 2022  PROVIDER:     Tony Mast MD    DATE OF PROCEDURE:  2022    EGD NOTE    A 40-year-old female, outpatient. REFERRING PROVIDER:  OLIMPIA Maldonado    INSTRUMENT USED:  Olympus GIF-Q180. ANESTHESIA:  The patient was premedicated with Diprivan intravenously as  administered by the anesthesiology service. INDICATIONS:  The patient has presented with a 0-xv-3-month history of  intermittent dysphagia. PROCEDURE:  The endoscope was inserted into the esophagus without  difficulty. The esophageal mucosa was entirely normal, revealing no  evidence of inflammatory or metaplastic change. The Z-line was located  at 39 cm. No obvious stricture was noted in the esophagus. The  stomach, duodenal bulb, and descending duodenum were all normal.  After  the endoscope was withdrawn, a #60-French Burkett dilator was passed  with no resistance. ESTIMATED BLOOD LOSS:  None. IMPRESSIONS:  1. Normal upper gastrointestinal endoscopy. 2.  Status post empiric esophageal dilatation. PLAN:  The patient will be observed for improvement. She will follow up  with me if still experiencing dysphagia. If improved, she can undergo  esophageal dilatation as needed in the future. ERICK Penaloza MD    D: 2022 9:43:02       T: 2022 9:47:23     [id number]  Job#: [id number]     Doc#: [id number]    CC:  Sergio Crews A. Sueann Buerger, MD

## 2022-09-28 ENCOUNTER — TELEMEDICINE (OUTPATIENT)
Dept: FAMILY MEDICINE CLINIC | Age: 47
End: 2022-09-28
Payer: COMMERCIAL

## 2022-09-28 DIAGNOSIS — N95.1 MENOPAUSAL SYMPTOMS: ICD-10-CM

## 2022-09-28 PROCEDURE — 99213 OFFICE O/P EST LOW 20 MIN: CPT | Performed by: NURSE PRACTITIONER

## 2022-09-28 RX ORDER — ESCITALOPRAM OXALATE 10 MG/1
TABLET ORAL
Qty: 90 TABLET | Refills: 2 | Status: SHIPPED | OUTPATIENT
Start: 2022-09-28

## 2022-09-28 RX ORDER — NORETHINDRONE 0.35 MG/1
TABLET ORAL
COMMUNITY
Start: 2022-09-02

## 2022-09-28 ASSESSMENT — ENCOUNTER SYMPTOMS
WHEEZING: 0
SHORTNESS OF BREATH: 0

## 2022-09-28 NOTE — PROGRESS NOTES
Len Montejo (:  1975) is a 52 y.o. female,Established patient, here for evaluation of the following chief complaint(s): Follow-up (HOT FLASH FOLLOW UP. )      ASSESSMENT/PLAN:  1. Menopausal symptoms  -Significantly improved with escitalopram at current dose. No changes today. Refill. Can follow-up for annual physical next year. -     escitalopram (LEXAPRO) 10 MG tablet; TAKE 1 TABLET BY MOUTH EVERY DAY, Disp-90 tablet, R-2Normal    Return in about 11 months (around 2023) for Annual Physical/Fasting Labs. SUBJECTIVE/OBJECTIVE:  JAK Ash presents today virtually for follow-up of menopausal symptoms. At her last appointment, she was started on Lexapro. She states that this is significantly helped with her symptoms. She feels that her mood is doing much better. Not as much fluctuation. Does not find herself crying as often. She also notes that she has had less hot flashes while on the medication. She did follow-up with gynecology who recommended over-the-counter supplementation. She is following with them annually. Tolerating Lexapro without side effects. Does not feel any changes are needed. Since last being seen, the patient did have EGD performed by GI. States that this helped her swallowing significantly. She also has colonoscopy scheduled. Review of Systems   Constitutional:  Negative for chills and fever. Respiratory:  Negative for shortness of breath and wheezing. Cardiovascular:  Negative for chest pain, palpitations and leg swelling. Neurological:  Negative for dizziness, weakness, light-headedness, numbness and headaches. Psychiatric/Behavioral:  Negative for decreased concentration, dysphoric mood, self-injury, sleep disturbance and suicidal ideas. The patient is not nervous/anxious.       Patient-Reported Vitals 2022   Patient-Reported Weight 219   Patient-Reported Height 56        Physical Exam  Constitutional:       General: She is not in acute distress. Appearance: Normal appearance. She is obese. Pulmonary:      Effort: Pulmonary effort is normal.   Neurological:      Mental Status: She is alert and oriented to person, place, and time. Psychiatric:         Mood and Affect: Mood normal.         Behavior: Behavior normal.         Thought Content: Thought content normal.         Judgment: Judgment normal.           On this date 9/28/2022 I have spent 20 minutes reviewing previous notes, test results and face to face (virtual) with the patient discussing the diagnosis and importance of compliance with the treatment plan as well as documenting on the day of the visit. Efren Vicente, was evaluated through a synchronous (real-time) audio-video encounter. The patient (or guardian if applicable) is aware that this is a billable service. Verbal consent to proceed has been obtained within the past 12 months. The visit was conducted pursuant to the emergency declaration under the [address] [place] [address], [address] [place] [address] authority and the [employer] and [employer] General Act. Patient identification was verified, and a caregiver was present when appropriate. The patient was located in a state where the provider was credentialed to provide care. This dictation was generated by voice recognition computer software. Although all attempts are made to edit the dictation for accuracy, there may be errors in the transcription that are not intended. An electronic signature was used to authenticate this note.     --RAMIN Maldonado - CNP

## 2022-10-12 NOTE — PROGRESS NOTES
[facility] ENDOSCOPY COLONOSCOPY PRE-OPERATIVE INSTRUCTIONS    Procedure date__10/19/2022_______Arrival time___0900_________        Surgery time___1000_________       Clear liquids the day before the procedure. Do not eat or drink anything within 5 hours of your procedure. This includes water chewing gum, mints and ice chips. You may brush your teeth and gargle the morning of your surgery, but do not swallow the water    You may be asked to stop blood thinners such as Coumadin, Plavix, Fragmin, Lovenox, etc., or any anti-inflammatories such as:  Aspirin, Ibuprofen, Advil, Naproxen prior to your procedure. We also ask that you stop any OTC medications such as fish oil, vitamin E, glucosamine, garlic, Multivitamins, COQ 10, etc.    You must make arrangements for a responsible adult to arrive with you and stay in our waiting area during your procedure. They will also need to take you home after your procedure. For your safety you will not be allowed to leave alone or drive yourself home. Also for your safety, it is strongly suggested that someone stay with you the first 24 hours after your procedure. For your comfort, please wear simple loose fitting clothing to the center. Please do not bring valuables. If you have a living will and a durable power of  for healthcare, please bring in a copy.      You will need to bring a photo ID and insurance card    Our goal is to provide you with excellent care so if you have any questions, please contact us at the [facility] at [phone]         Please note these are generalized instructions for all colonoscopy cases, you may be provided with more specific instructions if necessary

## 2022-10-18 ENCOUNTER — ANESTHESIA EVENT (OUTPATIENT)
Dept: ENDOSCOPY | Age: 47
End: 2022-10-18
Payer: COMMERCIAL

## 2022-10-19 ENCOUNTER — HOSPITAL ENCOUNTER (OUTPATIENT)
Age: 47
Setting detail: OUTPATIENT SURGERY
Discharge: HOME | End: 2022-10-19
Attending: INTERNAL MEDICINE | Admitting: INTERNAL MEDICINE
Payer: COMMERCIAL

## 2022-10-19 ENCOUNTER — ANESTHESIA (OUTPATIENT)
Dept: ENDOSCOPY | Age: 47
End: 2022-10-19
Payer: COMMERCIAL

## 2022-10-19 VITALS
SYSTOLIC BLOOD PRESSURE: 112 MMHG | TEMPERATURE: 97 F | DIASTOLIC BLOOD PRESSURE: 65 MMHG | RESPIRATION RATE: 17 BRPM | OXYGEN SATURATION: 100 % | HEART RATE: 74 BPM | BODY MASS INDEX: 34.37 KG/M2 | WEIGHT: 219 LBS | HEIGHT: 67 IN

## 2022-10-19 LAB — PREGNANCY, URINE: NEGATIVE

## 2022-10-19 PROCEDURE — 2580000003 HC RX 258: Performed by: STUDENT IN AN ORGANIZED HEALTH CARE EDUCATION/TRAINING PROGRAM

## 2022-10-19 PROCEDURE — 3609027000 HC COLONOSCOPY: Performed by: INTERNAL MEDICINE

## 2022-10-19 PROCEDURE — 3700000000 HC ANESTHESIA ATTENDED CARE: Performed by: INTERNAL MEDICINE

## 2022-10-19 PROCEDURE — 7100000010 HC PHASE II RECOVERY - FIRST 15 MIN: Performed by: INTERNAL MEDICINE

## 2022-10-19 PROCEDURE — 2500000003 HC RX 250 WO HCPCS: Performed by: NURSE ANESTHETIST, CERTIFIED REGISTERED

## 2022-10-19 PROCEDURE — 7100000011 HC PHASE II RECOVERY - ADDTL 15 MIN: Performed by: INTERNAL MEDICINE

## 2022-10-19 PROCEDURE — 3700000001 HC ADD 15 MINUTES (ANESTHESIA): Performed by: INTERNAL MEDICINE

## 2022-10-19 PROCEDURE — 2580000003 HC RX 258: Performed by: NURSE ANESTHETIST, CERTIFIED REGISTERED

## 2022-10-19 PROCEDURE — 84703 CHORIONIC GONADOTROPIN ASSAY: CPT

## 2022-10-19 PROCEDURE — 6360000002 HC RX W HCPCS: Performed by: NURSE ANESTHETIST, CERTIFIED REGISTERED

## 2022-10-19 RX ORDER — SODIUM CHLORIDE 9 MG/ML
INJECTION, SOLUTION INTRAVENOUS CONTINUOUS PRN
Status: DISCONTINUED | OUTPATIENT
Start: 2022-10-19 | End: 2022-10-19 | Stop reason: SDUPTHER

## 2022-10-19 RX ORDER — PROPOFOL 10 MG/ML
INJECTION, EMULSION INTRAVENOUS PRN
Status: DISCONTINUED | OUTPATIENT
Start: 2022-10-19 | End: 2022-10-19 | Stop reason: SDUPTHER

## 2022-10-19 RX ORDER — PROPOFOL 10 MG/ML
INJECTION, EMULSION INTRAVENOUS CONTINUOUS PRN
Status: DISCONTINUED | OUTPATIENT
Start: 2022-10-19 | End: 2022-10-19 | Stop reason: SDUPTHER

## 2022-10-19 RX ORDER — LIDOCAINE HYDROCHLORIDE 20 MG/ML
INJECTION, SOLUTION EPIDURAL; INFILTRATION; INTRACAUDAL; PERINEURAL PRN
Status: DISCONTINUED | OUTPATIENT
Start: 2022-10-19 | End: 2022-10-19 | Stop reason: SDUPTHER

## 2022-10-19 RX ORDER — SODIUM CHLORIDE 0.9 % (FLUSH) 0.9 %
5-40 SYRINGE (ML) INJECTION PRN
Status: DISCONTINUED | OUTPATIENT
Start: 2022-10-19 | End: 2022-10-19 | Stop reason: HOSPADM

## 2022-10-19 RX ORDER — SODIUM CHLORIDE 0.9 % (FLUSH) 0.9 %
5-40 SYRINGE (ML) INJECTION EVERY 12 HOURS SCHEDULED
Status: DISCONTINUED | OUTPATIENT
Start: 2022-10-19 | End: 2022-10-19 | Stop reason: HOSPADM

## 2022-10-19 RX ORDER — SODIUM CHLORIDE 9 MG/ML
INJECTION, SOLUTION INTRAVENOUS CONTINUOUS
Status: DISCONTINUED | OUTPATIENT
Start: 2022-10-19 | End: 2022-10-19 | Stop reason: HOSPADM

## 2022-10-19 RX ORDER — SODIUM CHLORIDE 9 MG/ML
INJECTION, SOLUTION INTRAVENOUS PRN
Status: DISCONTINUED | OUTPATIENT
Start: 2022-10-19 | End: 2022-10-19 | Stop reason: HOSPADM

## 2022-10-19 RX ADMIN — PROPOFOL 100 MG: 10 INJECTION, EMULSION INTRAVENOUS at 09:56

## 2022-10-19 RX ADMIN — SODIUM CHLORIDE: 9 INJECTION, SOLUTION INTRAVENOUS at 09:28

## 2022-10-19 RX ADMIN — LIDOCAINE HYDROCHLORIDE 100 MG: 20 INJECTION, SOLUTION EPIDURAL; INFILTRATION; INTRACAUDAL; PERINEURAL at 09:56

## 2022-10-19 RX ADMIN — PROPOFOL 200 MCG/KG/MIN: 10 INJECTION, EMULSION INTRAVENOUS at 09:56

## 2022-10-19 RX ADMIN — SODIUM CHLORIDE: 9 INJECTION, SOLUTION INTRAVENOUS at 09:04

## 2022-10-19 ASSESSMENT — LIFESTYLE VARIABLES: SMOKING_STATUS: 0

## 2022-10-19 ASSESSMENT — PAIN - FUNCTIONAL ASSESSMENT: PAIN_FUNCTIONAL_ASSESSMENT: NONE - DENIES PAIN

## 2022-10-19 NOTE — BRIEF OP NOTE
Brief Postoperative Note    Marek Gilbert  YOB: 1975  [id number]      Pre-operative Diagnosis: Screening    Previous Colonoscopy: No  When: unknown  Greater than 3 years? No      Post-operative Diagnosis: Same    Procedure: Colonoscopy    The preparation was good.     Anesthesia: MAC    Surgeons/Assistants: Trinity Kuhn MD    Estimated Blood Loss: None    Complications: None    Specimens: Was Not Obtained    Findings: See dictated report    Electronically signed by Trinity Kuhn MD on 7/10/2017 at 7:45 AM

## 2022-10-19 NOTE — DISCHARGE INSTRUCTIONS
[facility] Endoscopy [facility]OB Discharge Instructions  Colonoscopy    NAME:  Christiano Maza  YOB: 1975  MEDICAL RECORD NUMBER:  [id number]  DATE:  10/19/2022      After receiving Propofol (Diprivan) for Moderate Sedation:    Do not drive or operate any machinery until tomorrow  Do not sign any legal documents or make any critical decisions  Do not drink alcoholic beverages for 24 hours  Plan to spend a few hours resting before resuming your normal routine  Possible side effects are light headedness and sedation    You may resume your usual diet at home    Resume all your daily medications    Call your physician if any of the following occur:    Severe abdominal distention and/or pain. (Mild distention or cramping is normal after this procedure; this should improve within an hour or two with passage of air)  Fever, chills, nausea or vomiting  You may notice a small amount of blood in your next few bowel movements    If excessive bleeding occurs:  Call your physician immediately or proceed to the nearest Emergency Room    Biopsy Obtained: NO      Recommendations: Repeat colonoscopy in 10 years         For questions or concerns please contact your GI physician's 24 hour call center at [phone].

## 2022-10-19 NOTE — PROCEDURES
[address] 16                                 PROCEDURE NOTE    PATIENT NAME: Pedro Tamez                     :        1975  MED REC NO:   [id number]                          ROOM:  ACCOUNT NO:   [de-identified]                           ADMIT DATE: 10/19/2022  PROVIDER:     Liza Mast MD    DATE OF PROCEDURE:  10/19/2022    REFERRING PROVIDER:  OLIMPIA Dillon    PATIENT HISTORY:  A 51-year-old female, outpatient. OPERATION PERFORMED:  Colonoscopy. SURGEON:  Erick Mast MD    INSTRUMENT USED:  Olympus PCF-H180AL. ANESTHESIA:  The patient was premedicated with Diprivan intravenously as  administered by the anesthesiology service. INDICATIONS:  The patient presents for colon cancer screening. PROCEDURE:  The digital and anal exams were normal.  The colonoscope was  inserted to the cecum. The prep was good. No mucosal lesions were  identified. There were scattered diverticula throughout. ESTIMATED BLOOD LOSS:  None. IMPRESSION:  Scattered diverticulosis only. PLAN:  The patient should undergo a screening colonoscopy in 10 years. ERICK Irizarry MD    D: 10/19/2022 10:34:01       T: 10/19/2022 10:37:47     [id number]  Job#: [id number]     Doc#: [id number]    CC:  Liza Choi MD

## 2022-10-19 NOTE — H&P
[facility]   Pre-operative History and Physical    Patient: Fabby Casillas  : 1975  Acct#:         HISTORY OF PRESENT ILLNESS:    The patient is a 52 y.o. female  who presents with colon cancer screening  Past Medical History:        Diagnosis Date    Unspecified cerebral artery occlusion with cerebral infarction          Past Surgical History:        Procedure Laterality Date    ADENOIDECTOMY      ESOPHAGEAL DILATATION N/A 2022    ESOPHAGEAL DILATION [place] performed by Jessica Knight MD at [place] [address] ENDOSCOPY N/A 2022    EGD ESOPHAGOGASTRODUODENOSCOPY performed by Jessica Knight MD at [facility] ENDOSCOPY     Medications prior to admission:   Prior to Admission medications    Medication Sig Start Date End Date Taking?  Authorizing Provider   INCASSIA 0.35 MG tablet TAKE 1 TABLET BY MOUTH 1 TIME EACH DAY. 22   Historical Provider, MD   escitalopram (LEXAPRO) 10 MG tablet TAKE 1 TABLET BY MOUTH EVERY DAY 22   RAMIN Amador - SHAUN   norgestrel-ethinyl estradiol (LO/OVRAL) 0.3-30 MG-MCG per tablet Take 1 tablet by mouth daily    Historical Provider, MD     Allergies:    Penicillins  Social History:   Social History     Socioeconomic History    Marital status:      Spouse name: Not on file    Number of children: Not on file    Years of education: Not on file    Highest education level: Not on file   Occupational History    Not on file   Tobacco Use    Smoking status: Never    Smokeless tobacco: Never   Vaping Use    Vaping Use: Never used   Substance and Sexual Activity    Alcohol use: Yes     Comment: Social/Rare    Drug use: No    Sexual activity: Yes     Partners: Male   Other Topics Concern    Not on file   Social History Narrative    Not on file     Social Determinants of Health     Financial Resource Strain: Low Risk     Difficulty of Paying Living Expenses: Not hard at all   Food Insecurity: No Food Insecurity    Worried About Running Out of Food in the Last Year: Never true    Ran Out of Food in the Last Year: Never true   Transportation Needs: No Transportation Needs    Lack of Transportation (Medical): No    Lack of Transportation (Non-Medical): No   Physical Activity: Sufficiently Active    Days of Exercise per Week: 5 days    Minutes of Exercise per Session: 150+ min   Stress: Not on file   Social Connections: Not on file   Intimate Partner Violence: Not At Risk    Fear of Current or Ex-Partner: No    Emotionally Abused: No    Physically Abused: No    Sexually Abused: No   Housing Stability: Not on file           Family History:   Family History   Problem Relation Age of Onset    Diabetes Mother     Mental Illness Maternal Grandmother         Alzheimer         PHYSICAL EXAM:      /68   Pulse 81   Temp 97.3 °F (36.3 °C) (Temporal)   Resp 16   Ht 5' 6.5\" (1.689 m)   Wt 219 lb (99.3 kg)   SpO2 100%   BMI 34.82 kg/m²  I        Heart: Normal    Lungs: Normal    Abdomen: Normal      ASA Grade: ASA 3 - Patient with moderate systemic disease with functional limitations    II (soft palate, uvula, fauces visible)  ASSESSMENT AND PLAN:    1. Patient is a 52 y.o. female here for Colonoscopy  2. Procedure options, risks and benefits reviewed with patient who expresses understanding.

## 2022-10-19 NOTE — ANESTHESIA PRE PROCEDURE
Department of Anesthesiology  Preprocedure Note       Name:  Valentino Solders   Age:  52 y.o.  :  1975                                          MRN:  [id number]         Date:  10/19/2022      Surgeon: Ashvin Levin):  Alexander Farah MD    Procedure: Procedure(s):  COLORECTAL CANCER SCREENING, NOT HIGH RISK    Medications prior to admission:   Prior to Admission medications    Medication Sig Start Date End Date Taking? Authorizing Provider   INCASSIA 0.35 MG tablet TAKE 1 TABLET BY MOUTH 1 TIME EACH DAY. 22   Historical Provider, MD   escitalopram (LEXAPRO) 10 MG tablet TAKE 1 TABLET BY MOUTH EVERY DAY 22   RAMIN Zheng - SHAUN   norgestrel-ethinyl estradiol (LO/OVRAL) 0.3-30 MG-MCG per tablet Take 1 tablet by mouth daily    Historical Provider, MD       Current medications:    Current Facility-Administered Medications   Medication Dose Route Frequency Provider Last Rate Last Admin    0.9 % sodium chloride infusion   IntraVENous Continuous Kristi Zhou MD 75 mL/hr at 10/19/22 0928 New Bag at 10/19/22 0928    sodium chloride flush 0.9 % injection 5-40 mL  5-40 mL IntraVENous 2 times per day Kristi Zhou MD        sodium chloride flush 0.9 % injection 5-40 mL  5-40 mL IntraVENous PRN Kristi Zhou MD        0.9 % sodium chloride infusion   IntraVENous PRN Kristi Zhou MD         Facility-Administered Medications Ordered in Other Encounters   Medication Dose Route Frequency Provider Last Rate Last Admin    0.9 % sodium chloride infusion   IntraVENous Continuous PRN RAMIN Alcantara - CRNA   New Bag at 10/19/22 0372       Allergies:     Allergies   Allergen Reactions    Penicillins Swelling       Problem List:    Patient Active Problem List   Diagnosis Code   (none) - all problems resolved or deleted       Past Medical History:        Diagnosis Date    Unspecified cerebral artery occlusion with cerebral infarction            Past Surgical History:        Procedure Laterality Date    ADENOIDECTOMY      ESOPHAGEAL DILATATION N/A 9/21/2022    ESOPHAGEAL DILATION [place] performed by [facility]ik [facility] at [facility]      UPPER GASTROINTESTINAL ENDOSCOPY N/A 9/21/2022    EGD ESOPHAGOGASTRODUODENOSCOPY performed by Malik Lopez MD at [address] [place] [address] History:    Social History     Tobacco Use    Smoking status: Never    Smokeless tobacco: Never   Substance Use Topics    Alcohol use: Yes     Comment: Social/Rare                                Counseling given: Not Answered      Vital Signs (Current):   Vitals:    10/12/22 1304 10/19/22 0918   BP:  125/68   Pulse:  81   Resp:  16   Temp:  97.3 °F (36.3 °C)   TempSrc:  Temporal   SpO2:  100%   Weight: 219 lb (99.3 kg) 219 lb (99.3 kg)   Height: 5' 6.5\" (1.689 m) 5' 6.5\" (1.689 m)                                              BP Readings from Last 3 Encounters:   10/19/22 125/68   09/21/22 122/78   09/02/22 130/76       NPO Status: Time of last liquid consumption: 2200                        Time of last solid consumption: 1800                        Date of last liquid consumption: 10/18/22                        Date of last solid food consumption: 10/17/22    BMI:   Wt Readings from Last 3 Encounters:   10/19/22 219 lb (99.3 kg)   09/21/22 220 lb (99.8 kg)   09/02/22 219 lb (99.3 kg)     Body mass index is 34.82 kg/m².     CBC:   Lab Results   Component Value Date/Time    WBC 4.9 11/15/2018 10:55 AM    RBC 4.70 11/15/2018 10:55 AM    HGB 12.2 11/15/2018 10:55 AM    HCT 37.6 11/15/2018 10:55 AM    MCV 79.9 11/15/2018 10:55 AM    RDW 14.7 11/15/2018 10:55 AM     11/15/2018 10:55 AM       CMP:   Lab Results   Component Value Date/Time     08/25/2022 09:15 AM    K 4.4 08/25/2022 09:15 AM    K 3.9 11/15/2018 10:55 AM     08/25/2022 09:15 AM    CO2 26 08/25/2022 09:15 AM    BUN 9 08/25/2022 09:15 AM    CREATININE 0.7 08/25/2022 09:15 AM    GFRAA >60 08/25/2022 09:15 AM    GFRAA >60 04/18/2012 08:34 AM    AGRATIO 1.7 08/25/2022 09:15 AM    LABGLOM >60 08/25/2022 09:15 AM    GLUCOSE 103 08/25/2022 09:15 AM    PROT 7.0 08/25/2022 09:15 AM    CALCIUM 9.2 08/25/2022 09:15 AM    BILITOT 0.3 08/25/2022 09:15 AM    ALKPHOS 47 08/25/2022 09:15 AM    AST 14 08/25/2022 09:15 AM    ALT 11 08/25/2022 09:15 AM       POC Tests: No results for input(s): POCGLU, POCNA, POCK, POCCL, POCBUN, POCHEMO, POCHCT in the last 72 hours.     Coags:   Lab Results   Component Value Date/Time    PROTIME 9.8 05/29/2016 12:00 PM    INR 0.86 05/29/2016 12:00 PM    APTT 30.1 05/29/2016 12:00 PM       HCG (If Applicable):   Lab Results   Component Value Date    PREGTESTUR Negative 10/19/2022        ABGs: No results found for: PHART, PO2ART, JPQ5GLD, AUQ2KCO, BEART, Y5HBZYFS     Type & Screen (If Applicable):  No results found for: LABABO, LABRH    Drug/Infectious Status (If Applicable):  No results found for: HIV, HEPCAB    COVID-19 Screening (If Applicable): No results found for: COVID19        Anesthesia Evaluation  Patient summary reviewed no history of anesthetic complications:   Airway: Mallampati: II  TM distance: >3 FB     Mouth opening: > = 3 FB   Dental: normal exam         Pulmonary: breath sounds clear to auscultation      (-) COPD, asthma, sleep apnea, rhonchi, wheezes, rales and not a current smoker                           Cardiovascular:  Exercise tolerance: good (>4 METS),       (-) past MI, orthopnea,  GONZALEZ, weak pulses, peripheral edema and no hyperlipidemia      Rhythm: regular  Rate: normal                 ROS comment: Echocardiogram:  Conclusions:  Normal left ventricular contractility.     No significant structural or valvular abnormalities.          Neuro/Psych:   (+) TIA (2005), depression/anxiety    (-) seizures           GI/Hepatic/Renal:   (+) bowel prep,      (-) liver disease and no renal diseaseMorbid obesity: BMI: 35.       Endo/Other:    (+) Diabetes (HgbA1c: 6.7%), .    (-) hypothyroidism, hyperthyroidism                ROS comment: + Perimenopausal  Abdominal:   (+) obese,           Vascular:           ROS comment: Carotid Duplex (2006): Impression:     DFA: Normal velocities, bilateral internal carotid arteries. Duplex Imaging: Reveals no stenosis, bilateral internal carotid arteries. Vertebral blood flow is antegrade bilaterally.     . Other Findings:             Anesthesia Plan      MAC     ASA 3     (54 yo F with PMHx of DM, TIA, and obesity presenting for colonoscopy. Discussed risks and benefits to sedation including nausea, vomiting, allergic reaction, headache, delayed cognitive recovery, stroke, heart attack, respiratory depression, and death which patient understood and agreed to proceed. The patient was given the opportunity to ask questions and all questions were answered to the patient's satisfaction.  )  Induction: intravenous. Anesthetic plan and risks discussed with patient. Plan discussed with CRNA. This pre-anesthesia assessment may be used as a history and physical.    DOS STAFF ADDENDUM:    Pt seen and examined, chart reviewed (including anesthesia, drug and allergy history). No interval changes to history and physical examination. Anesthetic plan, risks, benefits, alternatives, and personnel involved discussed with patient. Patient verbalized an understanding and agrees to proceed.       Ginette Benz MD  October 19, 2022  9:36 AM

## 2022-12-20 ENCOUNTER — APPOINTMENT (OUTPATIENT)
Dept: CT IMAGING | Age: 47
End: 2022-12-20
Payer: COMMERCIAL

## 2022-12-20 ENCOUNTER — HOSPITAL ENCOUNTER (EMERGENCY)
Age: 47
Discharge: HOME | End: 2022-12-20
Attending: EMERGENCY MEDICINE
Payer: COMMERCIAL

## 2022-12-20 VITALS
BODY MASS INDEX: 34.66 KG/M2 | TEMPERATURE: 97.7 F | RESPIRATION RATE: 14 BRPM | SYSTOLIC BLOOD PRESSURE: 138 MMHG | OXYGEN SATURATION: 98 % | DIASTOLIC BLOOD PRESSURE: 87 MMHG | HEART RATE: 90 BPM | WEIGHT: 218 LBS

## 2022-12-20 DIAGNOSIS — S39.011A STRAIN OF ABDOMINAL WALL, INITIAL ENCOUNTER: Primary | ICD-10-CM

## 2022-12-20 DIAGNOSIS — N39.0 URINARY TRACT INFECTION IN FEMALE: ICD-10-CM

## 2022-12-20 LAB
A/G RATIO: 1.2 (ref 1.1–2.2)
ALBUMIN SERPL-MCNC: 3.9 G/DL (ref 3.4–5)
ALP BLD-CCNC: 46 U/L (ref 40–129)
ALT SERPL-CCNC: 9 U/L (ref 10–40)
ANION GAP SERPL CALCULATED.3IONS-SCNC: 8 MMOL/L (ref 3–16)
AST SERPL-CCNC: 11 U/L (ref 15–37)
BACTERIA: ABNORMAL /HPF
BASOPHILS ABSOLUTE: 0 K/UL (ref 0–0.2)
BASOPHILS RELATIVE PERCENT: 0.4 %
BILIRUB SERPL-MCNC: 0.5 MG/DL (ref 0–1)
BILIRUBIN URINE: NEGATIVE
BLOOD, URINE: ABNORMAL
BUN BLDV-MCNC: 8 MG/DL (ref 7–20)
CALCIUM SERPL-MCNC: 8.6 MG/DL (ref 8.3–10.6)
CHLORIDE BLD-SCNC: 101 MMOL/L (ref 99–110)
CLARITY: ABNORMAL
CO2: 27 MMOL/L (ref 21–32)
COLOR: YELLOW
CREAT SERPL-MCNC: 0.6 MG/DL (ref 0.6–1.1)
EOSINOPHILS ABSOLUTE: 0.1 K/UL (ref 0–0.6)
EOSINOPHILS RELATIVE PERCENT: 1.2 %
EPITHELIAL CELLS, UA: 2 /HPF (ref 0–5)
GFR SERPL CREATININE-BSD FRML MDRD: >60 ML/MIN/{1.73_M2}
GLUCOSE BLD-MCNC: 124 MG/DL (ref 70–99)
GLUCOSE URINE: NEGATIVE MG/DL
HCG QUALITATIVE: NEGATIVE
HCT VFR BLD CALC: 37.2 % (ref 36–48)
HEMOGLOBIN: 12.1 G/DL (ref 12–16)
HYALINE CASTS: 2 /LPF (ref 0–8)
KETONES, URINE: ABNORMAL MG/DL
LEUKOCYTE ESTERASE, URINE: ABNORMAL
LIPASE: 16 U/L (ref 13–60)
LYMPHOCYTES ABSOLUTE: 1.8 K/UL (ref 1–5.1)
LYMPHOCYTES RELATIVE PERCENT: 36.4 %
MCH RBC QN AUTO: 25.9 PG (ref 26–34)
MCHC RBC AUTO-ENTMCNC: 32.4 G/DL (ref 31–36)
MCV RBC AUTO: 80 FL (ref 80–100)
MICROSCOPIC EXAMINATION: YES
MONOCYTES ABSOLUTE: 0.4 K/UL (ref 0–1.3)
MONOCYTES RELATIVE PERCENT: 8.9 %
NEUTROPHILS ABSOLUTE: 2.7 K/UL (ref 1.7–7.7)
NEUTROPHILS RELATIVE PERCENT: 53.1 %
NITRITE, URINE: NEGATIVE
PDW BLD-RTO: 14.3 % (ref 12.4–15.4)
PH UA: 5.5 (ref 5–8)
PLATELET # BLD: 282 K/UL (ref 135–450)
PMV BLD AUTO: 7.5 FL (ref 5–10.5)
POTASSIUM SERPL-SCNC: 3.8 MMOL/L (ref 3.5–5.1)
PROTEIN UA: ABNORMAL MG/DL
RBC # BLD: 4.65 M/UL (ref 4–5.2)
RBC UA: 2 /HPF (ref 0–4)
SODIUM BLD-SCNC: 136 MMOL/L (ref 136–145)
SPECIFIC GRAVITY UA: 1.02 (ref 1–1.03)
TOTAL PROTEIN: 7.2 G/DL (ref 6.4–8.2)
URINE REFLEX TO CULTURE: YES
URINE TYPE: ABNORMAL
UROBILINOGEN, URINE: 1 E.U./DL
WBC # BLD: 5 K/UL (ref 4–11)
WBC UA: 26 /HPF (ref 0–5)

## 2022-12-20 PROCEDURE — 99284 EMERGENCY DEPT VISIT MOD MDM: CPT

## 2022-12-20 PROCEDURE — 6370000000 HC RX 637 (ALT 250 FOR IP): Performed by: NURSE PRACTITIONER

## 2022-12-20 PROCEDURE — 84703 CHORIONIC GONADOTROPIN ASSAY: CPT

## 2022-12-20 PROCEDURE — 83690 ASSAY OF LIPASE: CPT

## 2022-12-20 PROCEDURE — 87086 URINE CULTURE/COLONY COUNT: CPT

## 2022-12-20 PROCEDURE — 81001 URINALYSIS AUTO W/SCOPE: CPT

## 2022-12-20 PROCEDURE — 74176 CT ABD & PELVIS W/O CONTRAST: CPT

## 2022-12-20 PROCEDURE — 80053 COMPREHEN METABOLIC PANEL: CPT

## 2022-12-20 PROCEDURE — 85025 COMPLETE CBC W/AUTO DIFF WBC: CPT

## 2022-12-20 RX ORDER — KETOROLAC TROMETHAMINE 30 MG/ML
15 INJECTION, SOLUTION INTRAMUSCULAR; INTRAVENOUS ONCE
Status: DISCONTINUED | OUTPATIENT
Start: 2022-12-20 | End: 2022-12-20 | Stop reason: HOSPADM

## 2022-12-20 RX ORDER — NITROFURANTOIN 25; 75 MG/1; MG/1
100 CAPSULE ORAL ONCE
Status: COMPLETED | OUTPATIENT
Start: 2022-12-20 | End: 2022-12-20

## 2022-12-20 RX ORDER — NITROFURANTOIN 25; 75 MG/1; MG/1
100 CAPSULE ORAL 2 TIMES DAILY
Qty: 10 CAPSULE | Refills: 0 | Status: SHIPPED | OUTPATIENT
Start: 2022-12-20 | End: 2022-12-25

## 2022-12-20 RX ORDER — IBUPROFEN 600 MG/1
600 TABLET ORAL 4 TIMES DAILY PRN
Qty: 40 TABLET | Refills: 0 | Status: SHIPPED | OUTPATIENT
Start: 2022-12-20

## 2022-12-20 RX ADMIN — NITROFURANTOIN (MONOHYDRATE/MACROCRYSTALS) 100 MG: 75; 25 CAPSULE ORAL at 20:46

## 2022-12-20 ASSESSMENT — ENCOUNTER SYMPTOMS
VOMITING: 0
DIARRHEA: 0
NAUSEA: 0
ABDOMINAL PAIN: 1
SHORTNESS OF BREATH: 0
CHEST TIGHTNESS: 0

## 2022-12-20 ASSESSMENT — PAIN - FUNCTIONAL ASSESSMENT: PAIN_FUNCTIONAL_ASSESSMENT: 0-10

## 2022-12-20 ASSESSMENT — PAIN SCALES - GENERAL: PAINLEVEL_OUTOF10: 8

## 2022-12-20 ASSESSMENT — PAIN DESCRIPTION - LOCATION: LOCATION: ABDOMEN

## 2022-12-20 NOTE — ED PROVIDER NOTES
[facility]        Pt Name: Orlando Castro  MRN: [id number]  [place] 1975  Date of evaluation: 12/20/2022  Provider: RAMIN Appiah CNP  PCP: RAMNI Mckeon CNP  Note Started: 6:48 PM EST 12/20/22       I have seen and evaluated this patient with my supervising physician nini      [address] [place] [address]       Chief Complaint   Patient presents with    Abdominal Pain     Umbilical hernia repair in 2018, mid abd pain started today at work after lifting heavy packages. HISTORY OF PRESENT ILLNESS: 1 or more Elements     History from : Patient    Limitations to history : None    Orlando Castro is a 52 y.o. female who presents To the emergency department with complaint of a burning pain over the site of her ventral hernia repair from 2018. 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 She reports that each time she lifts the package she has severe pain, dull/burning pain is present at all times. Onset of symptoms a few weeks ago. She has not followed up with the surgeon at the [facility] that [place] her repair. Denies any headache, fever, lightheadedness, dizziness, visual disturbances. No chest pain or pressure. No neck or back pain. No shortness of breath, cough, or congestion. No nausea, vomiting, diarrhea, constipation, or dysuria. No rash. Nursing Notes were all reviewed and agreed with or any disagreements were addressed in the HPI. REVIEW OF SYSTEMS :      Review of Systems   Constitutional:  Negative for activity change, chills and fever. Respiratory:  Negative for chest tightness and shortness of breath. Cardiovascular:  Negative for chest pain. Gastrointestinal:  Positive for abdominal pain. Negative for diarrhea, nausea and vomiting. Genitourinary:  Negative for dysuria. All other systems reviewed and are negative.     Positives and Pertinent negatives as per HPI. SURGICAL HISTORY     Past Surgical History:   Procedure Laterality Date    ADENOIDECTOMY      COLONOSCOPY N/A 10/19/2022    COLORECTAL CANCER SCREENING, NOT HIGH RISK performed by Catalina Roger MD at [address] [place] [address] 9/21/2022    [address] performed by Catalina Roger MD at [place] [address] ENDOSCOPY N/A 9/21/2022    EGD ESOPHAGOGASTRODUODENOSCOPY performed by Catalina Roger MD at [facility] Medication List as of 12/20/2022  8:47 PM        CONTINUE these medications which have NOT CHANGED    Details   INCASSIA 0.35 MG tablet TAKE 1 TABLET BY MOUTH 1 TIME EACH DAY., DAWHistorical Med      escitalopram (LEXAPRO) 10 MG tablet TAKE 1 TABLET BY MOUTH EVERY DAY, Disp-90 tablet, R-2Normal      norgestrel-ethinyl estradiol (LO/OVRAL) 0.3-30 MG-MCG per tablet Take 1 tablet by mouth dailyHistorical Med             ALLERGIES     Penicillins    FAMILYHISTORY       Family History   Problem Relation Age of Onset    Diabetes Mother     Mental Illness Maternal Grandmother         Alzheimer        SOCIAL HISTORY       Social History     Tobacco Use    Smoking status: Never    Smokeless tobacco: Never   Vaping Use    Vaping Use: Never used   Substance Use Topics    Alcohol use: Yes     Comment: Social/Rare    Drug use: No       SCREENINGS        [place] Coma Scale  Eye Opening: Spontaneous  Best Verbal Response: Oriented  Best Motor Response: Obeys commands  Joaquín Coma Scale Score: 15                CIWA Assessment  BP: 138/87  Heart Rate: 90           PHYSICAL EXAM  1 or more Elements     ED Triage Vitals [12/20/22 1803]   BP Temp Temp Source Heart Rate Resp SpO2 Height Weight   138/87 97.7 °F (36.5 °C) Oral 90 14 98 % -- 218 lb (98.9 kg)       Physical Exam  Vitals and nursing note reviewed.    Constitutional: Appearance: She is well-developed. She is not diaphoretic. HENT:      Head: Normocephalic and atraumatic. Right Ear: External ear normal.      Left Ear: External ear normal.   Eyes:      General:         Right eye: No discharge. Left eye: No discharge. Neck:      Vascular: No JVD. Cardiovascular:      Rate and Rhythm: Normal rate. Pulses: Normal pulses. Heart sounds: Normal heart sounds. Pulmonary:      Effort: Pulmonary effort is normal. No respiratory distress. Breath sounds: Normal breath sounds. Abdominal:      Palpations: Abdomen is soft. Tenderness: There is abdominal tenderness. There is guarding. Musculoskeletal:         General: Normal range of motion. Skin:     General: Skin is warm and dry. Coloration: Skin is not pale. Neurological:      Mental Status: She is alert. Psychiatric:         Behavior: Behavior normal.           DIAGNOSTIC RESULTS   LABS:    Labs Reviewed   CBC WITH AUTO DIFFERENTIAL - Abnormal; Notable for the following components:       Result Value    MCH 25.9 (*)     All other components within normal limits   COMPREHENSIVE METABOLIC PANEL - Abnormal; Notable for the following components:    Glucose 124 (*)     ALT 9 (*)     AST 11 (*)     All other components within normal limits   URINALYSIS WITH REFLEX TO CULTURE - Abnormal; Notable for the following components:    Clarity, UA CLOUDY (*)     Ketones, Urine TRACE (*)     Blood, Urine MODERATE (*)     Protein, UA TRACE (*)     Leukocyte Esterase, Urine SMALL (*)     All other components within normal limits   MICROSCOPIC URINALYSIS - Abnormal; Notable for the following components:    Bacteria, UA 4+ (*)     WBC, UA 26 (*)     All other components within normal limits   CULTURE, URINE   LIPASE   HCG, SERUM, QUALITATIVE       When ordered only abnormal lab results are displayed. All other labs were within normal range or not returned as of this dictation. EKG:  When ordered, EKG's are interpreted by the Emergency Department Physician in the absence of a cardiologist.  Please see their note for interpretation of EKG. RADIOLOGY:   Non-plain film images such as CT, Ultrasound and MRI are read by the radiologist. Plain radiographic images are visualized and preliminarily interpreted by the ED Provider with the below findings:        Interpretation per the Radiologist below, if available at the time of this note:    CT ABDOMEN PELVIS WO CONTRAST Additional Contrast? None   Final Result   No acute intra-abdominal or pelvic abnormality. Specifically, no evidence of   recurrent anterior abdominal wall hernia           No results found. No results found. PROCEDURES   Unless otherwise noted below, none     Procedures    CRITICAL CARE TIME (.cctime)       PAST MEDICAL HISTORY      has a past medical history of Unspecified cerebral artery occlusion with cerebral infarction. Chronic Conditions affecting Care: CVA    EMERGENCY DEPARTMENT COURSE and DIFFERENTIAL DIAGNOSIS/MDM:   Vitals:    Vitals:    12/20/22 1803   BP: 138/87   Pulse: 90   Resp: 14   Temp: 97.7 °F (36.5 °C)   TempSrc: Oral   SpO2: 98%   Weight: 218 lb (98.9 kg)       Patient was given the following medications:  Medications   nitrofurantoin (macrocrystal-monohydrate) (MACROBID) capsule 100 mg (100 mg Oral Given 12/20/22 2046)             Is this patient to be included in the SEP-1 Core Measure due to severe sepsis or septic shock?    No   Exclusion criteria - the patient is NOT to be included for SEP-1 Core Measure due to:  2+ SIRS criteria are not met    CONSULTS: (Who and What was discussed)  None  Discussion with Other Profesionals : None    Social Determinants : none    Records Reviewed : Care Everywhere hernia repair    CC/HPI Summary, DDx, ED Course, and Reassessment:     Briefly, this is a 24-year-old female who presents To the emergency department with complaint of a burning pain over the site of her ventral hernia repair from 2018. 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 She reports that each time she lifts the package she has severe pain, dull/burning pain is present at all times. Onset of symptoms a few weeks ago. She has not followed up with the surgeon at the [address] [place] [address] that did her repair. Patient was given Macrobid after reviewing her urine and 4+ bacteria with multiple WBCs, sent for culture. We will continue this medication for 5 days. She is instructed to follow-up with primary care. Labs were otherwise unremarkable. CT was ordered to rule out emergent cause of abdominal pain including incarcerated hernia. CT ABDOMEN PELVIS WO CONTRAST Additional Contrast? None (Final result)  Result time 12/20/22 19:48:40  Final result by Sarah Raman MD (12/20/22 19:48:40)                Impression:    No acute intra-abdominal or pelvic abnormality. Specifically, no evidence of   recurrent anterior abdominal wall hernia               Patient was given reassurance that there is no hernia complication seen on CT scan, she will follow-up as needed. I did give her a couple of days off work and instructed her to rest, ibuprofen and ice as needed. I see nothing that would suggest an acute abdomen at this time. Based on history, physical exam, risk factors, and tests my suspicion for bowel obstruction, incarcerated hernia, acute pancreatitis, intra-abdominal abscess, perforated viscus, diverticulitis, cholecystitis, appendicitis, PID, ovarian torsion, ectopic pregnancy and tubo-ovarian abscess is very low. There is no evidence of peritonitis, sepsis or toxicity at this time. I feel the patient can be managed as an outpatient with follow-up with her family doctor in 24-48 hours. Instructions have been given for the patient to return to the ED for worsening of the pain, high fevers, intractable vomiting, or bleeding.        Disposition Considerations (include 1 Tests not done, Shared Decision Making, Pt Expectation of Test or Tx.): I did consider chest x-ray given the patient's abdominal pain however lungs are clear and vital signs are reassuring. She is not having any chest pain. Shared decision making used at time of discharge, the patient is comfortable with plan to discharge home and outpatient follow-up. Appropriate for outpatient management home      I am the Primary Clinician of Record. FINAL IMPRESSION      1. Strain of abdominal wall, initial encounter    2.  Urinary tract infection in female          DISPOSITION/PLAN     DISPOSITION Decision To Discharge 12/20/2022 08:41:29 PM      PATIENT REFERRED TO:  Osman Albrecht, APRN - [address]  [phone]    Schedule an appointment as soon as possible for a visit       DISCHARGE MEDICATIONS:  Discharge Medication List as of 12/20/2022  8:47 PM        START taking these medications    Details   nitrofurantoin, macrocrystal-monohydrate, (MACROBID) 100 MG capsule Take 1 capsule by mouth 2 times daily for 5 days, Disp-10 capsule, R-0Normal      ibuprofen (ADVIL;MOTRIN) 600 MG tablet Take 1 tablet by mouth 4 times daily as needed for Pain, Disp-40 tablet, R-0Normal             DISCONTINUED MEDICATIONS:  Discharge Medication List as of 12/20/2022  8:47 PM                 (Please note that portions of this note were completed with a voice recognition program.  Efforts were made to edit the dictations but occasionally words are mis-transcribed.)    RAMIN Tejeda CNP (electronically signed)            RAMIN Tejeda CNP  12/20/22 6209

## 2022-12-20 NOTE — Clinical Note
Carla Norwood was seen and treated in our emergency department on 12/20/2022. She may return to work on 12/22/2022. If you have any questions or concerns, please don't hesitate to call.       Leisa Munson, DO

## 2022-12-21 LAB — URINE CULTURE, ROUTINE: NORMAL

## 2022-12-22 NOTE — ED PROVIDER NOTES
In addition to the advanced practice provider, I personally saw Dolly Hyatt and performed a substantive portion of the visit including all aspects of the medical decision making. Briefly, this is a 52 y.o. female here for mild aching periumbilical abdominal pain ongoing for the past month. Patient with prior history of mesh hernia repair at this location in 2018, concern for hernia recurrence although she has not noticed any masses or bulges. 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 No bowel or bladder symptoms. On exam, patient afebrile and nontoxic. No distress. Heart RRR. Lungs CTAB. Abdomen soft, nondistended, mild tenderness palpation in periumbilical region. No focal right upper or right lower quadrant tenderness. No palpable masses. No CVA tenderness. No rebound, no rigidity, no guarding. Screenings   Taylor Coma Scale  Eye Opening: Spontaneous  Best Verbal Response: Oriented  Best Motor Response: Obeys commands  Joaquín Coma Scale Score: 15      Is this patient to be included in the SEP-1 Core Measure due to severe sepsis or septic shock? No   Exclusion criteria - the patient is NOT to be included for SEP-1 Core Measure due to: Infection is not suspected      MDM    Patient afebrile and nontoxic. No distress. Pregnancy negative. No peritoneal signs on abdominal exam, no focal findings to suggest acute appendicitis or cholecystitis. Presentation not suggestive of mesenteric ischemia. Labs work-up is reassuring without evidence of leukocytosis, endorgan dysfunction or clinically significant electrolyte derangement. Urinalysis with potential UTI, will treat in setting of abdominal pain. Nothing to suggest pyelonephritis. Patient not septic. CT imaging reveals no evidence of bowel obstruction, perforation, intra-abdominal infection or recurrent hernia. Suspect pain most likely muscular in etiology. Patient felt safe for discharge to self-care with close PCP follow-up. She is agreeable with plan and feels comfortable returning to home. Strict return precautions discussed. I Dr. Sotero George am the primary clinician of record. Patient Referrals:  Bre Roa, APRN - CNP  [address] [facility] [address]  [phone]    Schedule an appointment as soon as possible for a visit         Discharge Medications:  Discharge Medication List as of 12/20/2022  8:47 PM        START taking these medications    Details   nitrofurantoin, macrocrystal-monohydrate, (MACROBID) 100 MG capsule Take 1 capsule by mouth 2 times daily for 5 days, Disp-10 capsule, R-0Normal      ibuprofen (ADVIL;MOTRIN) 600 MG tablet Take 1 tablet by mouth 4 times daily as needed for Pain, Disp-40 tablet, R-0Normal             FINAL IMPRESSION  1. Strain of abdominal wall, initial encounter    2. Urinary tract infection in female        Blood pressure 138/87, pulse 90, temperature 97.7 °F (36.5 °C), temperature source Oral, resp. rate 14, weight 218 lb (98.9 kg), SpO2 98 %, unknown if currently breastfeeding. For further details of [facility] emergency department encounter, please see documentation by advanced practice provider, Hima Grijalva NP.     Sabina Sanders DO (electronically signed)  Attending Emergency Physician       Sabina Sanders DO  12/22/22 0920

## 2023-07-21 NOTE — ANESTHESIA POSTPROCEDURE EVALUATION
Department of Anesthesiology  Postprocedure Note    Patient: Velora Galeazzi  MRN: [id number]  [place]: 1975  Date of evaluation: 10/19/2022      Procedure Summary     Date: 10/19/22 Room / Location: [address] [place] [address]    Anesthesia Start: 5371 Anesthesia Stop: 6008    Procedure: COLORECTAL CANCER SCREENING, NOT HIGH RISK Diagnosis:       Screen for colon cancer      (Screen for colon cancer)    Surgeons: Jammie Pedraza MD Responsible Provider: Colletta Smiles, MD    Anesthesia Type: MAC ASA Status: 3          Anesthesia Type: No value filed. Justus Phase I: Justus Score: 10    Justus Phase II: Justus Score: 10      Anesthesia Post Evaluation    Patient location during evaluation: PACU  Patient participation: complete - patient participated  Level of consciousness: awake  Airway patency: patent  Nausea & Vomiting: no nausea and no vomiting  Cardiovascular status: blood pressure returned to baseline  Respiratory status: acceptable  Hydration status: stable  Comments: Vital signs stable  OK to discharge from Stage I post anesthesia care.   Care transferred from Anesthesiology department on discharge from perioperative area   Multimodal analgesia pain management approach UTI VRE +ve urine cx VRE +ve urine cx

## 2024-01-08 ENCOUNTER — HOSPITAL ENCOUNTER (EMERGENCY)
Age: 49
Discharge: HOME | End: 2024-01-08
Payer: COMMERCIAL

## 2024-01-08 ENCOUNTER — APPOINTMENT (OUTPATIENT)
Dept: GENERAL RADIOLOGY | Age: 49
End: 2024-01-08
Payer: COMMERCIAL

## 2024-01-08 VITALS
BODY MASS INDEX: 36.41 KG/M2 | HEART RATE: 100 BPM | DIASTOLIC BLOOD PRESSURE: 76 MMHG | OXYGEN SATURATION: 99 % | WEIGHT: 229 LBS | TEMPERATURE: 98.1 F | RESPIRATION RATE: 16 BRPM | SYSTOLIC BLOOD PRESSURE: 117 MMHG
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
## 2024-01-08 DIAGNOSIS — M67.40 GANGLION: ICD-10-CM

## 2024-01-08 PROCEDURE — 6370000000 HC RX 637 (ALT 250 FOR IP): Performed by: NURSE PRACTITIONER

## 2024-01-08 PROCEDURE — 73110 X-RAY EXAM OF WRIST: CPT

## 2024-01-08 PROCEDURE — 99283 EMERGENCY DEPT VISIT LOW MDM: CPT

## 2024-01-08 RX ORDER — MELOXICAM 15 MG/1
15 TABLET ORAL DAILY PRN
Qty: 30 TABLET | Refills: 0 | Status: SHIPPED | OUTPATIENT
Start: 2024-01-08

## 2024-01-08 RX ORDER — IBUPROFEN 600 MG/1
600 TABLET ORAL ONCE
Status: COMPLETED | OUTPATIENT
Start: 2024-01-08 | End: 2024-01-08

## 2024-01-08 RX ADMIN — IBUPROFEN 600 MG: 600 TABLET, FILM COATED ORAL at 20:57

## 2024-01-08 ASSESSMENT — PAIN DESCRIPTION - ORIENTATION
ORIENTATION: LEFT
ORIENTATION: LEFT

## 2024-01-08 ASSESSMENT — PAIN SCALES - GENERAL
PAINLEVEL_OUTOF10: 7
PAINLEVEL_OUTOF10: 7

## 2024-01-08 ASSESSMENT — ENCOUNTER SYMPTOMS
ABDOMINAL PAIN: 0
SHORTNESS OF BREATH: 0
NAUSEA: 0
DIARRHEA: 0
VOMITING: 0
CHEST TIGHTNESS: 0

## 2024-01-08 ASSESSMENT — PAIN - FUNCTIONAL ASSESSMENT: PAIN_FUNCTIONAL_ASSESSMENT: 0-10

## 2024-01-08 ASSESSMENT — LIFESTYLE VARIABLES
HOW MANY STANDARD DRINKS CONTAINING ALCOHOL DO YOU HAVE ON A TYPICAL DAY: PATIENT DOES NOT DRINK
HOW OFTEN DO YOU HAVE A DRINK CONTAINING ALCOHOL: NEVER

## 2024-01-08 ASSESSMENT — PAIN DESCRIPTION - LOCATION
LOCATION: WRIST
LOCATION: ARM

## 2024-01-09 ENCOUNTER — TELEPHONE (OUTPATIENT)
Dept: ORTHOPEDIC SURGERY | Age: 49
End: 2024-01-09

## 2024-01-09 NOTE — TELEPHONE ENCOUNTER
Left message for patient to return call if they would like to schedule a follow up appointment.Upon return call please schedule appt with Dr. QUINONES    Pt did return my call.     He states that he is still having pain.   He states that his pain is located in the abdominal area.     He states that he does ache all day long.     He states that he has one pain point on his right hip,  10/22 surgical site where did Dr. Benoit. He states that the incision has been painful since.     He is asking for more pain meds.   He does have a para on Mon 2/10.     He states that 6/7-10. He states that he's been napping and so feels good.       Asked about pain meds in which he states that he took 2 on a daily basis.     Asked about puncture site: looks good.      He states that he has nausea.   He does have nausea medication in which he had a scopolamine patch. He's not sure.     Asked about fever: he states that he did have one on Monday and Tues.     Asked about appetite.   He states that he hasn't had much.     BM? Normal.     I informed him that I will consult with Dr. Arteaga and then he can  the script after 4pm today.    He agrees to plan.    Priscila BEARD RN, BSN  Interventional Radiology Nurse Coordinator   Phone: [phone]

## 2024-01-09 NOTE — ED PROVIDER NOTES
In addition to the advanced practice provider, I personally saw Sandra Churchill and performed a substantive portion of the visit including all aspects of the medical decision making.    Briefly, this is a 48 y.o. female here for left wrist dorsal swelling and pain, mild pain with range of motion palpable lesion noted on left dorsal wrist.  No pulse deficit..        EKG      Screenings   Joaquín Coma Scale  Eye Opening: Spontaneous  Best Verbal Response: Oriented  Best Motor Response: Obeys commands  [place] Coma Scale Score: 15        MDM  Patient has clinical evidence of ganglion cyst no fracture dislocation outpatient follow-up orthopedics, anti-inflammatories, return if worse or new symptoms.  Discussed need for possible surgical intervention.  Stable for outpatient management        Patient Referrals:  Delroy Ruffin, APRN - CNP  [address] [place] [address]  [phone]    Schedule an appointment as soon as possible for a visit   As needed    Gilberto Blanton MD  [address] [facility]  [address]  [place] [address]  [phone]    Schedule an appointment as soon as possible for a visit         Discharge Medications:  Discharge Medication List as of 1/8/2024  9:13 PM        START taking these medications    Details   meloxicam (MOBIC) 15 MG tablet Take 1 tablet by mouth daily as needed for Pain, Disp-30 tablet, R-0Normal             FINAL IMPRESSION  1. Work related injury    2. Ganglion        Blood pressure 117/76, pulse 100, temperature 98.1 °F (36.7 °C), temperature source Oral, resp. rate 16, weight 103.9 kg (229 lb), SpO2 99 %, unknown if currently breastfeeding.     For further details of Sandra Churchill's emergency department encounter, please see documentation by advanced practice provider, MIRNA.    JAMEY DAMON MD (electronically signed)  Attending Emergency Physician       Jamey Damon MD  01/09/24 2294    
General:         Right eye: No discharge.         Left eye: No discharge.   Neck:      Vascular: No JVD.   Cardiovascular:      Rate and Rhythm: Normal rate.      Pulses: Normal pulses.   Pulmonary:      Effort: Pulmonary effort is normal. No respiratory distress.      Breath sounds: Normal breath sounds.   Abdominal:      Palpations: Abdomen is soft.   Musculoskeletal:         General: Normal range of motion.        Arms:    Skin:     General: Skin is warm and dry.      Coloration: Skin is not pale.   Neurological:      Mental Status: She is alert.   Psychiatric:         Behavior: Behavior normal.             DIAGNOSTIC RESULTS   LABS:    Labs Reviewed - No data to display    When ordered only abnormal lab results are displayed. All other labs were within normal range or not returned as of this dictation.    EKG: When ordered, EKG's are interpreted by the Emergency Department Physician in the absence of a cardiologist.  Please see their note for interpretation of EKG.    RADIOLOGY:   Non-plain film images such as CT, Ultrasound and MRI are read by the radiologist. Plain radiographic images are visualized and preliminarily interpreted by the ED Provider with the below findings:        Interpretation per the Radiologist below, if available at the time of this note:    XR WRIST LEFT (MIN 3 VIEWS)   Final Result   1. No acute fracture or dislocation.   2. 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   3. Early osteoarthritis at the 1st carpometacarpal joint.           XR WRIST LEFT (MIN 3 VIEWS)    Result Date: 1/8/2024  EXAMINATION: 3 XRAY VIEWS OF THE LEFT WRIST 1/8/2024 7:35 pm COMPARISON: None. HISTORY: ORDERING SYSTEM PROVIDED HISTORY: poss break TECHNOLOGIST PROVIDED HISTORY: Reason for exam:->poss break Reason for Exam: poss break FINDINGS: Mineralization appears normal.  There is normal carpal alignment.  There is an ossification with the cortex adjacent

## 2024-01-10 ENCOUNTER — OFFICE VISIT (OUTPATIENT)
Dept: ORTHOPEDIC SURGERY | Age: 49
End: 2024-01-10
Payer: COMMERCIAL

## 2024-01-10 VITALS — HEIGHT: 66 IN | WEIGHT: 229 LBS | BODY MASS INDEX: 36.8 KG/M2

## 2024-01-10 DIAGNOSIS — M67.432 GANGLION CYST OF WRIST, LEFT: Primary | ICD-10-CM

## 2024-01-10 PROCEDURE — 99203 OFFICE O/P NEW LOW 30 MIN: CPT | Performed by: PHYSICIAN ASSISTANT

## 2024-01-10 NOTE — PROGRESS NOTES
ORTHOPAEDIC NEW PATIENT NOTE    Chief Complaint   Patient presents with    New Patient     NP Lt Wrist       HPI  1/10/24  48 y.o. female RHD seen for evaluation of left wrist pain:  [facility] claim  Onset 1/8/24  Injury/trauma: Pt was lifting boxes at work when she felt a pop in the left wrist and has had pain ever since  Patient works as a  at the post office   History of symptoms: No previous issues with left wrist  Pain is located over the dorsal radial aspect of wrist up into the mid forearm  She has also noticed a palpable lump in the dorsal wrist since her injury  Intermittent N/T in the hand, as well  Describes the pain as throbbing   Worse with attempted lifting or trying to  anything  Better with wrist brace and Mobic given in ED      Review of Systems  Constitutional - denies fevers, weight loss  Cardiovascular - denies chest pain, palpitations, peripheral edema, blood clots  Respiratory - denies SOB, cough  Gastrointestinal - denies abdominal pain, nausea, vomiting  Genitourinary - denies dysuria, discharge  Musculoskeletal - per HPI  Integumentary - denies rash, sores  Neurologic - denies numbness, tingling, paresthesias  Hematologic - denies abnormal bleeding, blood clots  Allergic/Immunologic - denies metal allergies, recurrent infections    Allergies   Allergen Reactions    Penicillins Swelling        Current Outpatient Medications   Medication Sig Dispense Refill    meloxicam (MOBIC) 15 MG tablet Take 1 tablet by mouth daily as needed for Pain 30 tablet 0    norgestrel-ethinyl estradiol (LO/OVRAL) 0.3-30 MG-MCG per tablet Take 1 tablet by mouth daily       No current facility-administered medications for this visit.       Past Medical History:   Diagnosis Date    Unspecified cerebral artery occlusion with cerebral infarction     2005        Past Surgical History:   Procedure Laterality Date    ADENOIDECTOMY      COLONOSCOPY N/A 10/19/2022    COLORECTAL CANCER SCREENING, NOT HIGH RISK performed by

## 2024-01-12 ENCOUNTER — TELEPHONE (OUTPATIENT)
Dept: FAMILY MEDICINE CLINIC | Age: 49
End: 2024-01-12

## 2024-01-12 NOTE — TELEPHONE ENCOUNTER
----- Message from Sandra Larios sent at 1/12/2024  3:30 PM EST -----  Subject: Appointment Request    Reason for Call: Established Patient Appointment needed: Routine ED Follow   Up Visit    QUESTIONS    Reason for appointment request? No appointments available during search     Additional Information for Provider? pt needs a ED f/u, lifted a box and   felt a sharp pain and swelling in her left wrist and hand was at work was   sent to ED ,pt is a workers comp claim still having tingling and numbness   ,was seen on 1-8-2024 at [place]   ---------------------------------------------------------------------------  --------------  CALL BACK INFO  [phone]; OK to leave message on voicemail  ---------------------------------------------------------------------------  --------------  SCRIPT ANSWERS

## 2024-01-16 ENCOUNTER — TELEPHONE (OUTPATIENT)
Dept: ORTHOPEDIC SURGERY | Age: 49
End: 2024-01-16

## 2024-01-16 NOTE — TELEPHONE ENCOUNTER
Yes, that is fine. You can have her on no lifting with the left upper extremity until follow up with Dr. Kennedy.

## 2024-01-16 NOTE — TELEPHONE ENCOUNTER
S/W SANDRA  She c/o numbness in her whole hand and continues to have wrist pain. She states the intensity of the numbness has increased since her LOV. She denies discoloration and upon walking through testing, cap refill is within 1-2 seconds.   Sandra reports continuing to wear her brace but also states that her work is unable to accommodate her restrictions as everything weighs more than 10 pounds.   Offered to schedule appointment with [facility] which she wishes to proceed with. I did offer her an open appointment tomorrow in [place] which she declined, wishing to be seen at [facility]. Currently the first available appointment at [facility] is 1/26/2024 which she has been scheduled for.   Sandra expressed concerns regarding her current work restrictions. Offered to have her request sent to [employer] and [employer]ELL[employer] for review; if they will accommodate the request to alter her work restrictions, the note will be sent via [employer]. If they do not feel an alteration is appropriate, she will receive a call back; however, she was made aware this answer may not be received until 1/17/2024 due to the providers being in surgery at the time. Patient voiced understanding of the conversation and will contact the office with further questions or concerns.

## 2024-01-16 NOTE — TELEPHONE ENCOUNTER
PATIENT CALL REQUEST NUMBNESS IN LEFT WRIST:    PATIENT WOULD LIKE A CALL SHE IS EXPERIENCING NUMBNESS AND SOME SWELLING IN HER LEFT WRIST.   PLEASE CONTACT HER [phone]

## 2024-01-16 NOTE — TELEPHONE ENCOUNTER
Following the patient's visit on 1/10/2024 and follow up phone call on 1/16/2024, Dr. Blanton wishes to request the following for the patient:   Referral to Dr. Frederic Kennedy   Referral in chart and scheduled appt for 1/26/2024 due to patient's sx and request.       Please advise clinical staff if further action is needed and/or when approval is received.

## 2024-01-23 SDOH — HEALTH STABILITY: PHYSICAL HEALTH: ON AVERAGE, HOW MANY MINUTES DO YOU ENGAGE IN EXERCISE AT THIS LEVEL?: 140 MIN

## 2024-01-23 SDOH — HEALTH STABILITY: PHYSICAL HEALTH: ON AVERAGE, HOW MANY DAYS PER WEEK DO YOU ENGAGE IN MODERATE TO STRENUOUS EXERCISE (LIKE A BRISK WALK)?: 5 DAYS

## 2024-01-26 ENCOUNTER — OFFICE VISIT (OUTPATIENT)
Dept: ORTHOPEDIC SURGERY | Age: 49
End: 2024-01-26

## 2024-01-26 VITALS — WEIGHT: 229 LBS | BODY MASS INDEX: 36.8 KG/M2 | RESPIRATION RATE: 16 BRPM | HEIGHT: 66 IN

## 2024-01-26 DIAGNOSIS — R20.0 HAND NUMBNESS: Primary | ICD-10-CM

## 2024-01-26 NOTE — PROGRESS NOTES
tingling on the left, normal on the right.   Vascular examination reveals normal, good capillary refill, and good color bilaterally  There is no Swelling bilaterally  There is no evidence of gross joint instability bilaterally.  Muscular strength is clinically appropriate bilaterally. Left wrist motion is mildly stiff from immobilization.   There is a 1.5 cm Soft and Fluid Filled mass on the Dorsal aspect of the wrist adjacent to the 4th extensor compartment.   The mass is mildly tender to palpation, unchanged in maximal wrist flexion/extension.  Examination for Carpal Tunnel Syndrome shows Carpal Tunnel Compression Test to be Negative on the right & Equivocal on the left.  The patient displays mild baseline symptoms to potentially confound the exam.  The thenar musculature is not atrophied & weakened.      Impression:  Ms. Sandra Churchill has developed a Dorsal wrist mass, which is clinically consistent with a ganglion cyst and left hand numbness and tingling, and is currently exacerbated, and presents requesting further treatment.    Plan:  I have had a thorough discussion with Ms. Sandra Churchill regarding the treatment options available for her initially presenting Left ganglion cyst, which is causing her significant symptoms and difficulty.  I have outlined for Ms. Sandra Churchill the risk, benefits and consequences of the various treatment modalities, including a reasonable expectation for the long term success of each.  We have discussed the likelihood that further, more aggressive treatment may be required for her current presenting condition.  Based upon our current discussion and a reasonable understating of the options available to her, Ms. Sandra Churchill has selected to proceed with a conservative plan of treatment consisting of: the use of protective splints, activity modification, and the judicious use of over-the-counter anti-inflammatory medications if allowed by her primary care physician.  An

## 2024-03-18 ENCOUNTER — OFFICE VISIT (OUTPATIENT)
Dept: ORTHOPEDIC SURGERY | Age: 49
End: 2024-03-18

## 2024-03-18 VITALS — WEIGHT: 229 LBS | RESPIRATION RATE: 16 BRPM | HEIGHT: 66 IN | BODY MASS INDEX: 36.8 KG/M2

## 2024-03-18 DIAGNOSIS — M67.432 GANGLION CYST OF WRIST, LEFT: Primary | ICD-10-CM

## 2024-03-18 NOTE — PROGRESS NOTES
Ms. Sandra Churchill returns today in follow-up of her previously treated  left Dorsal wrist ganglion cyst.  She was last seen by me in January, 2024 at which time she was treated with conservative measures and splinting of the left wrist.  She experienced minimal relief of her initial symptoms.  She  has noticed symptom worsening over the last several weeks.  She returns today with worsened symptoms of Left, Wrist pain, requesting further treatment. She states that she has dorsal numbness and tingling at times that has not improved.     I have today reviewed with Sandra Churchill the clinically relevant, past medical history, medications, allergies,  family history, social history, and Review Of Systems & I have documented any details relevant to today's presenting complaints in my history above.  Ms. Sandra Churchill's self-reported past medical history, medications, allergies,  family history, social history, and Review Of Systems have been scanned into the chart under the \"Media\" tab.    Physical Exam:  Vitals  Respirations: 16  Height: 167.6 cm (5' 6\")  Weight - Scale: 103.9 kg (229 lb)  Ms. Sandra Churchill appears well, she is in no apparent distress, she demonstrates appropriate mood & affect.      Skin: Normal in appearance, Normal Color, and Normal Texture Bilaterally   Digital range of motion is Full bilaterally  Wrist range of motion is without significant limitation on the Left, normal on the Right  There is no evidence of gross joint instability bilaterally.  Sensation is subjectively normal in the Whole Hand bilaterally and objectively normal in the same distribution bilaterally  Vascular examination reveals normal, good capillary refill, and good color bilaterally  Swelling is absent bilaterally  Muscular strength is clinically appropriate bilaterally.  There is a palpable mass measuring approximately 10 mm in greatest dimension on the Dorsal surface of the Left, Wrist.  The mass is not tender to palpation,

## 2024-05-07 ENCOUNTER — TELEPHONE (OUTPATIENT)
Dept: ORTHOPEDIC SURGERY | Age: 49
End: 2024-05-07

## 2024-05-07 NOTE — TELEPHONE ENCOUNTER
General Question     Subject: SCHEDULING SURGERY FOR WRIST  Patient and /or Facility Request: JAMES  Contact Number: [phone]    Her workers comp claim has now been reopened for consideration can she now procedure with having surgery.

## 2024-05-10 NOTE — TELEPHONE ENCOUNTER
Left message for patient.  [employer] website states claim is still denied and that procedure can be done under private insurance

## 2024-06-25 ENCOUNTER — TELEPHONE (OUTPATIENT)
Dept: ORTHOPEDIC SURGERY | Age: 49
End: 2024-06-25

## 2024-06-25 NOTE — TELEPHONE ENCOUNTER
General Question     Subject: WC SX QUESTION  Patient and /or Facility Request: JAMES WEINSTEIN  Contact Number: [phone]     WOULD LIKE TO SPEAK TO OFFICE

## 2024-07-15 ENCOUNTER — HOSPITAL ENCOUNTER (EMERGENCY)
Age: 49
Discharge: HOME | End: 2024-07-15
Attending: EMERGENCY MEDICINE
Payer: COMMERCIAL

## 2024-07-15 ENCOUNTER — APPOINTMENT (OUTPATIENT)
Dept: CT IMAGING | Age: 49
End: 2024-07-15
Payer: COMMERCIAL

## 2024-07-15 VITALS
BODY MASS INDEX: 36.98 KG/M2 | SYSTOLIC BLOOD PRESSURE: 116 MMHG | WEIGHT: 235.6 LBS | HEART RATE: 78 BPM | DIASTOLIC BLOOD PRESSURE: 76 MMHG | TEMPERATURE: 97.8 F | HEIGHT: 67 IN | OXYGEN SATURATION: 94 % | RESPIRATION RATE: 18 BRPM

## 2024-07-15 DIAGNOSIS — R51.9 HEADACHE, UNSPECIFIED HEADACHE TYPE: Primary | ICD-10-CM

## 2024-07-15 LAB
ALBUMIN SERPL-MCNC: 4.4 G/DL (ref 3.4–5)
ALBUMIN/GLOB SERPL: 1.5 {RATIO} (ref 1.1–2.2)
ALP SERPL-CCNC: 44 U/L (ref 40–129)
ALT SERPL-CCNC: 12 U/L (ref 10–40)
ANION GAP SERPL CALCULATED.3IONS-SCNC: 9 MMOL/L (ref 3–16)
AST SERPL-CCNC: 19 U/L (ref 15–37)
BASOPHILS # BLD: 0 K/UL (ref 0–0.2)
BASOPHILS NFR BLD: 0.5 %
BILIRUB SERPL-MCNC: 0.4 MG/DL (ref 0–1)
BUN SERPL-MCNC: 13 MG/DL (ref 7–20)
CALCIUM SERPL-MCNC: 9.2 MG/DL (ref 8.3–10.6)
CHLORIDE SERPL-SCNC: 101 MMOL/L (ref 99–110)
CO2 SERPL-SCNC: 25 MMOL/L (ref 21–32)
CREAT SERPL-MCNC: 0.6 MG/DL (ref 0.6–1.1)
DEPRECATED RDW RBC AUTO: 14.7 % (ref 12.4–15.4)
EOSINOPHIL # BLD: 0.1 K/UL (ref 0–0.6)
EOSINOPHIL NFR BLD: 1.6 %
FLUAV RNA RESP QL NAA+PROBE: NOT DETECTED
FLUBV RNA RESP QL NAA+PROBE: NOT DETECTED
GFR SERPLBLD CREATININE-BSD FMLA CKD-EPI: >90 ML/MIN/{1.73_M2}
GLUCOSE SERPL-MCNC: 126 MG/DL (ref 70–99)
HCG SERPL QL: NEGATIVE
HCT VFR BLD AUTO: 36.7 % (ref 36–48)
HGB BLD-MCNC: 12.1 G/DL (ref 12–16)
LYMPHOCYTES # BLD: 1.9 K/UL (ref 1–5.1)
LYMPHOCYTES NFR BLD: 38.7 %
MCH RBC QN AUTO: 26.7 PG (ref 26–34)
MCHC RBC AUTO-ENTMCNC: 33.1 G/DL (ref 31–36)
MCV RBC AUTO: 80.8 FL (ref 80–100)
MONOCYTES # BLD: 0.3 K/UL (ref 0–1.3)
MONOCYTES NFR BLD: 6.3 %
NEUTROPHILS # BLD: 2.7 K/UL (ref 1.7–7.7)
NEUTROPHILS NFR BLD: 52.9 %
PLATELET # BLD AUTO: 278 K/UL (ref 135–450)
PMV BLD AUTO: 7.9 FL (ref 5–10.5)
POTASSIUM SERPL-SCNC: 4 MMOL/L (ref 3.5–5.1)
PROT SERPL-MCNC: 7.3 G/DL (ref 6.4–8.2)
RBC # BLD AUTO: 4.54 M/UL (ref 4–5.2)
SARS-COV-2 RNA RESP QL NAA+PROBE: NOT DETECTED
SODIUM SERPL-SCNC: 135 MMOL/L (ref 136–145)
WBC # BLD AUTO: 5 K/UL (ref 4–11)

## 2024-07-15 PROCEDURE — 85025 COMPLETE CBC W/AUTO DIFF WBC: CPT

## 2024-07-15 PROCEDURE — 99285 EMERGENCY DEPT VISIT HI MDM: CPT

## 2024-07-15 PROCEDURE — 80053 COMPREHEN METABOLIC PANEL: CPT

## 2024-07-15 PROCEDURE — 87636 SARSCOV2 & INF A&B AMP PRB: CPT

## 2024-07-15 PROCEDURE — 70450 CT HEAD/BRAIN W/O DYE: CPT

## 2024-07-15 PROCEDURE — 84703 CHORIONIC GONADOTROPIN ASSAY: CPT

## 2024-07-15 PROCEDURE — 96374 THER/PROPH/DIAG INJ IV PUSH: CPT

## 2024-07-15 PROCEDURE — 6360000002 HC RX W HCPCS: Performed by: PHYSICIAN ASSISTANT

## 2024-07-15 PROCEDURE — 6360000004 HC RX CONTRAST MEDICATION: Performed by: PHYSICIAN ASSISTANT

## 2024-07-15 PROCEDURE — 70498 CT ANGIOGRAPHY NECK: CPT

## 2024-07-15 RX ORDER — BUTALBITAL, ACETAMINOPHEN AND CAFFEINE 300; 40; 50 MG/1; MG/1; MG/1
1 CAPSULE ORAL EVERY 8 HOURS PRN
Qty: 15 CAPSULE | Refills: 0 | Status: SHIPPED | OUTPATIENT
Start: 2024-07-15 | End: 2024-07-15

## 2024-07-15 RX ORDER — BUTALBITAL, ACETAMINOPHEN AND CAFFEINE 300; 40; 50 MG/1; MG/1; MG/1
1 CAPSULE ORAL EVERY 8 HOURS PRN
Qty: 15 CAPSULE | Refills: 0 | Status: SHIPPED | OUTPATIENT
Start: 2024-07-15 | End: 2024-07-16

## 2024-07-15 RX ORDER — KETOROLAC TROMETHAMINE 15 MG/ML
15 INJECTION, SOLUTION INTRAMUSCULAR; INTRAVENOUS ONCE
Status: COMPLETED | OUTPATIENT
Start: 2024-07-15 | End: 2024-07-15

## 2024-07-15 RX ADMIN — IOPAMIDOL 75 ML: 755 INJECTION, SOLUTION INTRAVENOUS at 11:45

## 2024-07-15 RX ADMIN — KETOROLAC TROMETHAMINE 15 MG: 15 INJECTION, SOLUTION INTRAMUSCULAR; INTRAVENOUS at 10:51

## 2024-07-15 ASSESSMENT — ENCOUNTER SYMPTOMS
COUGH: 0
VOMITING: 0
SORE THROAT: 0
RHINORRHEA: 0
EYE DISCHARGE: 0
DIARRHEA: 0
SHORTNESS OF BREATH: 0
ABDOMINAL PAIN: 0
STRIDOR: 0
EYE REDNESS: 0
NAUSEA: 0
EYE ITCHING: 0
BACK PAIN: 0

## 2024-07-15 ASSESSMENT — LIFESTYLE VARIABLES
HOW OFTEN DO YOU HAVE A DRINK CONTAINING ALCOHOL: MONTHLY OR LESS
HOW MANY STANDARD DRINKS CONTAINING ALCOHOL DO YOU HAVE ON A TYPICAL DAY: 1 OR 2

## 2024-07-15 ASSESSMENT — PAIN - FUNCTIONAL ASSESSMENT: PAIN_FUNCTIONAL_ASSESSMENT: 0-10

## 2024-07-15 ASSESSMENT — PAIN DESCRIPTION - LOCATION
LOCATION: HEAD
LOCATION: HEAD

## 2024-07-15 ASSESSMENT — PAIN DESCRIPTION - ORIENTATION: ORIENTATION: RIGHT

## 2024-07-15 ASSESSMENT — PAIN SCALES - GENERAL
PAINLEVEL_OUTOF10: 8
PAINLEVEL_OUTOF10: 8

## 2024-07-15 NOTE — ED PROVIDER NOTES
[facility] EMERGENCY DEPARTMENT  EMERGENCY DEPARTMENT ENCOUNTER        Pt Name: Sandra Churchill  MRN: [id number]  Birthdate 1975  Date of evaluation: 7/15/2024  Provider: TRANG Bull  PCP: Delroy Ruffin APRN - SHAUN  Note Started: 10:04 AM EDT 7/15/24       I have seen and evaluated this patient with my supervising physician Dr. Jessica Nieto.      CHIEF COMPLAINT       Chief Complaint   Patient presents with    Headache     HX previous TIA and aneurysm, pt states headache x4-5 days that does get better with meds but comes back, pt states burning in head as well       HISTORY OF PRESENT ILLNESS: 1 or more Elements     History From: Patient  Limitations to history : None    Sandra Churchill is a 48 y.o. female with history of left frontal lobe CVA in 2005 at which time she presented with slurred speech and headaches who presents to the emergency department with 4-day history of headache that started on the left side and has transition to the right.  She is concerned for recurrent CVA.  Patient denies any other neurologic deficits, although reports a couple days ago she did have a moment of expressive aphasia.  Today she is denying vision changes, focal weakness, numbness/tingling or aphasia.  She is also concerned about possible aneurysmal rupture due to finding of aneurysm in 2005.  Patient is not currently on any anticoagulants.  She does take birth control.  Patient denies any recent falls or injuries.  Headache does improve with Excedrin, but then immediately returns.  She states this feels different from previous headaches.  She reports lightheadedness, but denies nausea.  No other acute complaints at this time.    Nursing Notes were all reviewed and agreed with or any disagreements were addressed in the HPI.    REVIEW OF SYSTEMS :      Review of Systems   Constitutional:  Negative for chills, diaphoresis and fever.   HENT:  Negative for congestion, rhinorrhea and sore throat.

## 2024-07-15 NOTE — ED PROVIDER NOTES
[facility] Emergency Department      Pt Name: Sandra Churchill  MRN: [id number]  Birthdate 1975  Date of evaluation: 7/15/2024  Provider: ANDREA TORO MD  I personally saw Sandra Churchill and made and approved the management plan with the advanced practice provider.  I take responsibility for the patient management.     HPI: Sandra Churchill presented with   Chief Complaint   Patient presents with    Headache     HX previous TIA and aneurysm, pt states headache x4-5 days that does get better with meds but comes back, pt states burning in head as well     Sandra Churchill has a past medical history of Unspecified cerebral artery occlusion with cerebral infarction.  She has a past surgical history that includes Tonsillectomy; Adenoidectomy; hernia repair; Upper gastrointestinal endoscopy (N/A, 9/21/2022); Esophagus dilation (N/A, 9/21/2022); and Colonoscopy (N/A, 10/19/2022).    No current facility-administered medications on file prior to encounter.     Current Outpatient Medications on File Prior to Encounter   Medication Sig Dispense Refill    meloxicam (MOBIC) 15 MG tablet Take 1 tablet by mouth daily as needed for Pain 30 tablet 0    norgestrel-ethinyl estradiol (LO/OVRAL) 0.3-30 MG-MCG per tablet Take 1 tablet by mouth daily       PHYSICAL EXAM  Vitals: /76   Pulse 78   Temp 97.8 °F (36.6 °C) (Oral)   Resp 18   Ht 1.689 m (5' 6.5\")   Wt 106.9 kg (235 lb 9.6 oz)   LMP 06/24/2024   SpO2 94%   BMI 37.46 kg/m²   Constitutional:  48 y.o. female alert  HENT:  Atraumatic, oral mucosa moist  Neck:  No visible JVD, supple  Chest/Lungs:  Respiratory effort normal  Abdomen:  Non-distended  Back:  No gross deformity  Extremities:  Normal tone and perfusion  Neurologic:  Alert, speech normal, mentation normal, pupils equal, normal coordination of extremities, no facial asymmetry     Medical Decision Making: Briefly, this is an 48 y.o.female who presented with headache, hx of brain aneurysm and TIA.  Denies

## 2024-07-16 RX ORDER — BUTALBITAL, ACETAMINOPHEN AND CAFFEINE 300; 40; 50 MG/1; MG/1; MG/1
1 CAPSULE ORAL EVERY 8 HOURS PRN
Qty: 15 CAPSULE | Refills: 0 | Status: SHIPPED | OUTPATIENT
Start: 2024-07-16